# Patient Record
Sex: FEMALE | Race: ASIAN | Employment: OTHER | URBAN - METROPOLITAN AREA
[De-identification: names, ages, dates, MRNs, and addresses within clinical notes are randomized per-mention and may not be internally consistent; named-entity substitution may affect disease eponyms.]

---

## 2017-06-30 PROCEDURE — 87086 URINE CULTURE/COLONY COUNT: CPT | Performed by: UROLOGY

## 2017-06-30 PROCEDURE — 82330 ASSAY OF CALCIUM: CPT | Performed by: INTERNAL MEDICINE

## 2017-06-30 PROCEDURE — 81001 URINALYSIS AUTO W/SCOPE: CPT | Performed by: UROLOGY

## 2017-06-30 PROCEDURE — 83970 ASSAY OF PARATHORMONE: CPT | Performed by: INTERNAL MEDICINE

## 2017-07-28 PROBLEM — M81.0 OSTEOPOROSIS, UNSPECIFIED OSTEOPOROSIS TYPE, UNSPECIFIED PATHOLOGICAL FRACTURE PRESENCE: Status: ACTIVE | Noted: 2017-07-28

## 2017-07-28 PROBLEM — E21.0 PRIMARY HYPERPARATHYROIDISM (HCC): Status: ACTIVE | Noted: 2017-07-28

## 2018-06-27 PROCEDURE — 83970 ASSAY OF PARATHORMONE: CPT | Performed by: INTERNAL MEDICINE

## 2018-09-08 ENCOUNTER — HOSPITAL ENCOUNTER (OUTPATIENT)
Dept: GENERAL RADIOLOGY | Age: 83
Discharge: HOME | End: 2018-09-08
Attending: INTERNAL MEDICINE
Payer: MEDICARE

## 2018-09-08 DIAGNOSIS — M54.5 LOW BACK PAIN, UNSPECIFIED BACK PAIN LATERALITY, UNSPECIFIED CHRONICITY, WITH SCIATICA PRESENCE UNSPECIFIED: ICD-10-CM

## 2018-09-08 PROCEDURE — 72072 X-RAY EXAM THORAC SPINE 3VWS: CPT | Performed by: INTERNAL MEDICINE

## 2018-09-08 PROCEDURE — 72100 X-RAY EXAM L-S SPINE 2/3 VWS: CPT | Performed by: INTERNAL MEDICINE

## 2019-11-11 ENCOUNTER — HOSPITAL ENCOUNTER (OUTPATIENT)
Dept: ULTRASOUND IMAGING | Facility: HOSPITAL | Age: 84
Discharge: HOME | End: 2019-11-11
Attending: INTERNAL MEDICINE
Payer: MEDICARE

## 2019-11-11 ENCOUNTER — EKG ENCOUNTER (OUTPATIENT)
Dept: LAB | Facility: HOSPITAL | Age: 84
End: 2019-11-11
Attending: INTERNAL MEDICINE
Payer: MEDICARE

## 2019-11-11 DIAGNOSIS — R42 DIZZINESS: Primary | ICD-10-CM

## 2019-11-11 DIAGNOSIS — I10 HTN (HYPERTENSION): ICD-10-CM

## 2019-11-11 DIAGNOSIS — R42 DIZZINESS: ICD-10-CM

## 2019-11-11 PROCEDURE — 93010 ELECTROCARDIOGRAM REPORT: CPT | Performed by: INTERNAL MEDICINE

## 2019-11-11 PROCEDURE — 93005 ELECTROCARDIOGRAM TRACING: CPT

## 2019-11-11 PROCEDURE — 93880 EXTRACRANIAL BILAT STUDY: CPT | Performed by: INTERNAL MEDICINE

## 2021-10-12 ENCOUNTER — HOSPITAL ENCOUNTER (INPATIENT)
Facility: HOSPITAL | Age: 86
LOS: 4 days | Discharge: HOSPICE | DRG: 481 | End: 2021-10-17
Attending: EMERGENCY MEDICINE | Admitting: INTERNAL MEDICINE
Payer: MEDICARE

## 2021-10-12 DIAGNOSIS — S72.002A CLOSED FRACTURE OF LEFT HIP, INITIAL ENCOUNTER (HCC): Primary | ICD-10-CM

## 2021-10-13 ENCOUNTER — ANESTHESIA EVENT (OUTPATIENT)
Dept: SURGERY | Facility: HOSPITAL | Age: 86
DRG: 481 | End: 2021-10-13
Payer: MEDICARE

## 2021-10-13 ENCOUNTER — ANESTHESIA (OUTPATIENT)
Dept: EMERGENCY DEPT | Facility: HOSPITAL | Age: 86
DRG: 481 | End: 2021-10-13
Payer: MEDICARE

## 2021-10-13 ENCOUNTER — ANESTHESIA EVENT (OUTPATIENT)
Dept: EMERGENCY DEPT | Facility: HOSPITAL | Age: 86
DRG: 481 | End: 2021-10-13
Payer: MEDICARE

## 2021-10-13 ENCOUNTER — APPOINTMENT (OUTPATIENT)
Dept: GENERAL RADIOLOGY | Facility: HOSPITAL | Age: 86
DRG: 481 | End: 2021-10-13
Attending: EMERGENCY MEDICINE
Payer: MEDICARE

## 2021-10-13 PROBLEM — S72.002A CLOSED FRACTURE OF LEFT HIP (HCC): Status: ACTIVE | Noted: 2021-10-13

## 2021-10-13 PROBLEM — I10 PRIMARY HYPERTENSION: Chronic | Status: ACTIVE | Noted: 2021-10-13

## 2021-10-13 PROBLEM — S72.002A CLOSED FRACTURE OF LEFT HIP, INITIAL ENCOUNTER (HCC): Status: ACTIVE | Noted: 2021-10-13

## 2021-10-13 PROCEDURE — 3E0T3BZ INTRODUCTION OF ANESTHETIC AGENT INTO PERIPHERAL NERVES AND PLEXI, PERCUTANEOUS APPROACH: ICD-10-PCS

## 2021-10-13 PROCEDURE — 99222 1ST HOSP IP/OBS MODERATE 55: CPT | Performed by: ORTHOPAEDIC SURGERY

## 2021-10-13 PROCEDURE — 99223 1ST HOSP IP/OBS HIGH 75: CPT | Performed by: INTERNAL MEDICINE

## 2021-10-13 PROCEDURE — 73502 X-RAY EXAM HIP UNI 2-3 VIEWS: CPT | Performed by: EMERGENCY MEDICINE

## 2021-10-13 RX ORDER — HYDROCODONE BITARTRATE AND ACETAMINOPHEN 5; 325 MG/1; MG/1
1 TABLET ORAL EVERY 4 HOURS PRN
Status: DISCONTINUED | OUTPATIENT
Start: 2021-10-13 | End: 2021-10-17

## 2021-10-13 RX ORDER — CINACALCET 30 MG/1
30 TABLET, FILM COATED ORAL 2 TIMES DAILY WITH MEALS
Status: DISCONTINUED | OUTPATIENT
Start: 2021-10-13 | End: 2021-10-17

## 2021-10-13 RX ORDER — ONDANSETRON 2 MG/ML
4 INJECTION INTRAMUSCULAR; INTRAVENOUS EVERY 6 HOURS PRN
Status: DISCONTINUED | OUTPATIENT
Start: 2021-10-13 | End: 2021-10-15

## 2021-10-13 RX ORDER — MORPHINE SULFATE 2 MG/ML
1 INJECTION, SOLUTION INTRAMUSCULAR; INTRAVENOUS EVERY 2 HOUR PRN
Status: DISCONTINUED | OUTPATIENT
Start: 2021-10-13 | End: 2021-10-15

## 2021-10-13 RX ORDER — SODIUM CHLORIDE 9 MG/ML
INJECTION, SOLUTION INTRAVENOUS CONTINUOUS
Status: DISCONTINUED | OUTPATIENT
Start: 2021-10-13 | End: 2021-10-17

## 2021-10-13 RX ORDER — ENOXAPARIN SODIUM 100 MG/ML
40 INJECTION SUBCUTANEOUS DAILY
Status: DISCONTINUED | OUTPATIENT
Start: 2021-10-13 | End: 2021-10-13

## 2021-10-13 RX ORDER — ACETAMINOPHEN 325 MG/1
650 TABLET ORAL EVERY 4 HOURS PRN
Status: DISCONTINUED | OUTPATIENT
Start: 2021-10-13 | End: 2021-10-17

## 2021-10-13 RX ORDER — MORPHINE SULFATE 2 MG/ML
2 INJECTION, SOLUTION INTRAMUSCULAR; INTRAVENOUS ONCE
Status: COMPLETED | OUTPATIENT
Start: 2021-10-13 | End: 2021-10-13

## 2021-10-13 RX ORDER — HYDROCODONE BITARTRATE AND ACETAMINOPHEN 5; 325 MG/1; MG/1
2 TABLET ORAL EVERY 4 HOURS PRN
Status: DISCONTINUED | OUTPATIENT
Start: 2021-10-13 | End: 2021-10-17

## 2021-10-13 RX ORDER — MORPHINE SULFATE 4 MG/ML
4 INJECTION, SOLUTION INTRAMUSCULAR; INTRAVENOUS EVERY 2 HOUR PRN
Status: DISCONTINUED | OUTPATIENT
Start: 2021-10-13 | End: 2021-10-15

## 2021-10-13 RX ORDER — ROPIVACAINE HYDROCHLORIDE 5 MG/ML
30 INJECTION, SOLUTION EPIDURAL; INFILTRATION; PERINEURAL AS NEEDED
Status: DISCONTINUED | OUTPATIENT
Start: 2021-10-13 | End: 2021-10-13 | Stop reason: ALTCHOICE

## 2021-10-13 RX ORDER — TAMSULOSIN HYDROCHLORIDE 0.4 MG/1
0.4 CAPSULE ORAL DAILY
Status: DISCONTINUED | OUTPATIENT
Start: 2021-10-13 | End: 2021-10-17

## 2021-10-13 RX ORDER — ONDANSETRON 2 MG/ML
4 INJECTION INTRAMUSCULAR; INTRAVENOUS ONCE
Status: COMPLETED | OUTPATIENT
Start: 2021-10-13 | End: 2021-10-13

## 2021-10-13 RX ORDER — ENOXAPARIN SODIUM 100 MG/ML
30 INJECTION SUBCUTANEOUS DAILY
Status: DISCONTINUED | OUTPATIENT
Start: 2021-10-15 | End: 2021-10-17

## 2021-10-13 RX ORDER — LIDOCAINE HYDROCHLORIDE 10 MG/ML
2 INJECTION, SOLUTION EPIDURAL; INFILTRATION; INTRACAUDAL; PERINEURAL AS NEEDED
Status: DISCONTINUED | OUTPATIENT
Start: 2021-10-13 | End: 2021-10-13 | Stop reason: ALTCHOICE

## 2021-10-13 RX ORDER — SODIUM CHLORIDE 9 MG/ML
10 INJECTION INTRAVENOUS AS NEEDED
Status: DISCONTINUED | OUTPATIENT
Start: 2021-10-13 | End: 2021-10-13 | Stop reason: ALTCHOICE

## 2021-10-13 RX ORDER — MORPHINE SULFATE 2 MG/ML
2 INJECTION, SOLUTION INTRAMUSCULAR; INTRAVENOUS EVERY 2 HOUR PRN
Status: DISCONTINUED | OUTPATIENT
Start: 2021-10-13 | End: 2021-10-15

## 2021-10-13 NOTE — ED INITIAL ASSESSMENT (HPI)
Pt arrives via EMS from home, pt lives with daughter, she is at cartside for translation. Pt was getting out of bed where she lost her balance and fell on left side, pt states she has left hip pain. Denies LOC or head trauma.  Given 25 mg of fentanyl via EM

## 2021-10-13 NOTE — PROGRESS NOTES
NURSING ADMISSION NOTE      Patient admitted via Cart  Oriented to room. Daughter at bedside. Speaks mandarin with very little english. Safety precautions initiated. Bed in low position. Call light in reach.

## 2021-10-13 NOTE — H&P
[facility]                                                               History & Physical         Mert Galicia Patient Status:  Inpatient    1929 MRN [id number]   [facility] 3SW-A Attending Angus Coles MD   Hosp Day # 0 PCP smoking. She has never used smokeless tobacco. She reports that she does not drink alcohol and does not use drugs.     Allergies:    Penicillins             HIVES  Aspirin                     Comment:Upset stomach    Home Medications:  0.9%  NaCl infusion, 10/13/2021    CA 10.3 10/13/2021    ALB 3.3 10/13/2021    ALKPHO 92 10/13/2021    BILT 0.6 10/13/2021    TP 7.1 10/13/2021    AST 36 10/13/2021    ALT 27 10/13/2021       Imaging:  XRAY pelvis/right hip preliminary report as below  IMPRESSION:  Suggestion

## 2021-10-13 NOTE — ED PROVIDER NOTES
Patient Seen in: [facility] Emergency Department      History   Patient presents with:  Fall    Stated Complaint:     Subjective:   HPI    Patient is a 51-year-old female who presents from home with left-sided hip pain after she fell out of bed.   Pa Comments: Mildly uncomfortable. HENT:      Head: Normocephalic and atraumatic. Eyes:      Conjunctiva/sclera: Conjunctivae normal.      Pupils: Pupils are equal, round, and reactive to light.    Cardiovascular:      Rate and Rhythm: Normal rate and regu changes/callus formation is not excluded (vs artifact/overlying material). CT may be considered for better charaterization, as needed. Osteopenia. DJD/DDD/minor scoliosis. Left abd clips. Right lateral pelvic region soft tissue calcific opacity.

## 2021-10-13 NOTE — CONSULTS
[facility] Ortho Consult Note    CC: left hip injury    HPI: This 80year old female admitted from ER for fall/left hip fracture.  Per conversation with daughter (due to patient dementia) patient had a fall last November and sustained compression fractures in spin not oriented, shouting methodically  Psychological: appropriate affect  Respiratory: unlabored breathing  Cardiac: regular rate  Left lower extremity:  • Inspection: skin intact, no ecchymosis or swelling  • Palpation: tender to palpation about hip  • Rang [phone]  Fax [phone]

## 2021-10-13 NOTE — PROGRESS NOTES
[facility] Pharmacy Note:  Renal Dose Adjustment for enoxaparin (LOVENOX)    Angela Kehr has been prescribed enoxaparin 40 mg subcutaneously every 24 hours. Estimated Creatinine Clearance: 22.3 mL/min (A) (based on SCr of 1.27 mg/dL (H)).     Calculated CrCl 2

## 2021-10-13 NOTE — PLAN OF CARE
Patient resting in room, vitals WDL, on RA. SCD's. Tele. Purewick in place. Patient's sister at bedside. Patient speaks mandarin and understands verbal cues in [place]. Patient declines pain medication. Patient unable to move LLE.   Plan of care discussed w

## 2021-10-13 NOTE — PROGRESS NOTES
2:28 pm:  Patient hasn't voided since arriving to unit. No output in purewick canister or brief. Bladder scan showed 231 ml. Paged , recommended straight cath. 2:57 pm:  Straight cathed patient per , 400 ml out.  UA sent down to la

## 2021-10-13 NOTE — PROGRESS NOTES
Spoke to Dr. Haseeb Littlejohn regarding patient. Plan is for surgery tomorrow, NPO status to resume at midnight. OK to eat today per . Dr. Haseeb Littlejohn is planning on seeing the patient sometime today.

## 2021-10-13 NOTE — ANESTHESIA PROCEDURE NOTES
Regional Block  Performed by: Tabitha Johnson MD  Authorized by: Tabitha Johnson MD       General Information and Staff    Start Time:  10/13/2021 1:23 AM  End Time:  10/13/2021 1:29 AM  Anesthesiologist:  Tabitha Johnson MD  Performed by:   Anesthesiologist  Patient L

## 2021-10-13 NOTE — ANESTHESIA PREPROCEDURE EVALUATION
PRE-OP EVALUATION    Patient Name: Katerina Hooker    Admit Diagnosis: Closed fracture of left hip[facility] L[facility]    Pre-op Diagnosis: INPT    NAILING OF LEFT PROXIMAL FEMUR FRACTURE    Anesthesia Procedure: NAILING OF LEFT PROXIMAL FEMUR Rfl: 0, Unknown at Unknown time  Omega-3 Fatty Acids (FISH OIL OR), Take by mouth., Disp: , Rfl: , Unknown at Unknown time  Misc Natural Products (GLUCOSAMINE CHOND COMPLEX/MSM OR), Take by mouth., Disp: , Rfl: , Unknown at Unknown time  Acetaminophen 500 MCHC 30.9 (L) 10/13/2021    RDW 13.7 10/13/2021    .0 10/13/2021     Lab Results   Component Value Date     10/13/2021    K 4.3 10/13/2021     10/13/2021    CO2 28.0 10/13/2021    BUN 26 (H) 10/13/2021    CREATSERUM 1.27 (H) 10/13/202

## 2021-10-13 NOTE — PLAN OF CARE
Patient admitted from the ED with a L hip fracture after falling out of bed at home. Patient lives full time with her daughter. Patient is confused and oriented to self. Roxanna Bucio, daughter at bedside for translation.  Femoral block in ED for pain man

## 2021-10-14 ENCOUNTER — APPOINTMENT (OUTPATIENT)
Dept: GENERAL RADIOLOGY | Facility: HOSPITAL | Age: 86
DRG: 481 | End: 2021-10-14
Attending: ORTHOPAEDIC SURGERY
Payer: MEDICARE

## 2021-10-14 ENCOUNTER — ANESTHESIA (OUTPATIENT)
Dept: SURGERY | Facility: HOSPITAL | Age: 86
DRG: 481 | End: 2021-10-14
Payer: MEDICARE

## 2021-10-14 PROCEDURE — 27245 TREAT THIGH FRACTURE: CPT | Performed by: ORTHOPAEDIC SURGERY

## 2021-10-14 PROCEDURE — 76000 FLUOROSCOPY <1 HR PHYS/QHP: CPT | Performed by: ORTHOPAEDIC SURGERY

## 2021-10-14 PROCEDURE — 0QS706Z REPOSITION LEFT UPPER FEMUR WITH INTRAMEDULLARY INTERNAL FIXATION DEVICE, OPEN APPROACH: ICD-10-PCS | Performed by: ORTHOPAEDIC SURGERY

## 2021-10-14 PROCEDURE — 99233 SBSQ HOSP IP/OBS HIGH 50: CPT | Performed by: HOSPITALIST

## 2021-10-14 PROCEDURE — 76942 ECHO GUIDE FOR BIOPSY: CPT | Performed by: ANESTHESIOLOGY

## 2021-10-14 PROCEDURE — 73501 X-RAY EXAM HIP UNI 1 VIEW: CPT | Performed by: ORTHOPAEDIC SURGERY

## 2021-10-14 DEVICE — ZNN CMN NAIL 11.5MMX21.5CM 130L
Type: IMPLANTABLE DEVICE | Site: HIP | Status: FUNCTIONAL
Brand: ZIMMER® NATURAL NAIL® SYSTEM

## 2021-10-14 DEVICE — ZNN CMN LAG SCREW 10.5X90
Type: IMPLANTABLE DEVICE | Site: HIP | Status: FUNCTIONAL
Brand: ZIMMER® NATURAL NAIL® SYSTEM

## 2021-10-14 DEVICE — SCREW CORT FA 5.0X35 Z NAIL: Type: IMPLANTABLE DEVICE | Site: HIP | Status: FUNCTIONAL

## 2021-10-14 RX ORDER — SODIUM CHLORIDE, SODIUM LACTATE, POTASSIUM CHLORIDE, CALCIUM CHLORIDE 600; 310; 30; 20 MG/100ML; MG/100ML; MG/100ML; MG/100ML
INJECTION, SOLUTION INTRAVENOUS CONTINUOUS
Status: DISCONTINUED | OUTPATIENT
Start: 2021-10-14 | End: 2021-10-14 | Stop reason: HOSPADM

## 2021-10-14 RX ORDER — DEXAMETHASONE SODIUM PHOSPHATE 4 MG/ML
4 VIAL (ML) INJECTION AS NEEDED
Status: DISCONTINUED | OUTPATIENT
Start: 2021-10-14 | End: 2021-10-14 | Stop reason: HOSPADM

## 2021-10-14 RX ORDER — NALOXONE HYDROCHLORIDE 0.4 MG/ML
80 INJECTION, SOLUTION INTRAMUSCULAR; INTRAVENOUS; SUBCUTANEOUS AS NEEDED
Status: DISCONTINUED | OUTPATIENT
Start: 2021-10-14 | End: 2021-10-14 | Stop reason: HOSPADM

## 2021-10-14 RX ORDER — ROCURONIUM BROMIDE 10 MG/ML
INJECTION, SOLUTION INTRAVENOUS AS NEEDED
Status: DISCONTINUED | OUTPATIENT
Start: 2021-10-14 | End: 2021-10-14 | Stop reason: SURG

## 2021-10-14 RX ORDER — HYDROMORPHONE HYDROCHLORIDE 1 MG/ML
0.4 INJECTION, SOLUTION INTRAMUSCULAR; INTRAVENOUS; SUBCUTANEOUS EVERY 5 MIN PRN
Status: DISCONTINUED | OUTPATIENT
Start: 2021-10-14 | End: 2021-10-14 | Stop reason: HOSPADM

## 2021-10-14 RX ORDER — ERGOCALCIFEROL 1.25 MG/1
50000 CAPSULE ORAL
Status: DISCONTINUED | OUTPATIENT
Start: 2021-10-15 | End: 2021-10-17

## 2021-10-14 RX ORDER — ACETAMINOPHEN 500 MG
1000 TABLET ORAL EVERY 6 HOURS PRN
Status: DISCONTINUED | OUTPATIENT
Start: 2021-10-14 | End: 2021-10-17

## 2021-10-14 RX ORDER — ONDANSETRON 2 MG/ML
4 INJECTION INTRAMUSCULAR; INTRAVENOUS AS NEEDED
Status: DISCONTINUED | OUTPATIENT
Start: 2021-10-14 | End: 2021-10-14 | Stop reason: HOSPADM

## 2021-10-14 RX ORDER — METOCLOPRAMIDE HYDROCHLORIDE 5 MG/ML
10 INJECTION INTRAMUSCULAR; INTRAVENOUS AS NEEDED
Status: DISCONTINUED | OUTPATIENT
Start: 2021-10-14 | End: 2021-10-14 | Stop reason: HOSPADM

## 2021-10-14 RX ORDER — HYDROCODONE BITARTRATE AND ACETAMINOPHEN 5; 325 MG/1; MG/1
1 TABLET ORAL AS NEEDED
Status: DISCONTINUED | OUTPATIENT
Start: 2021-10-14 | End: 2021-10-14 | Stop reason: HOSPADM

## 2021-10-14 RX ORDER — ONDANSETRON 2 MG/ML
4 INJECTION INTRAMUSCULAR; INTRAVENOUS EVERY 4 HOURS PRN
Status: ACTIVE | OUTPATIENT
Start: 2021-10-14 | End: 2021-10-16

## 2021-10-14 RX ORDER — PROCHLORPERAZINE EDISYLATE 5 MG/ML
10 INJECTION INTRAMUSCULAR; INTRAVENOUS EVERY 6 HOURS PRN
Status: ACTIVE | OUTPATIENT
Start: 2021-10-14 | End: 2021-10-16

## 2021-10-14 RX ORDER — CEFAZOLIN SODIUM/WATER 2 G/20 ML
2 SYRINGE (ML) INTRAVENOUS EVERY 8 HOURS
Status: CANCELLED | OUTPATIENT
Start: 2021-10-15 | End: 2021-10-15

## 2021-10-14 RX ORDER — HYDROMORPHONE HYDROCHLORIDE 1 MG/ML
INJECTION, SOLUTION INTRAMUSCULAR; INTRAVENOUS; SUBCUTANEOUS
Status: COMPLETED
Start: 2021-10-14 | End: 2021-10-14

## 2021-10-14 RX ORDER — SODIUM CHLORIDE, SODIUM LACTATE, POTASSIUM CHLORIDE, CALCIUM CHLORIDE 600; 310; 30; 20 MG/100ML; MG/100ML; MG/100ML; MG/100ML
INJECTION, SOLUTION INTRAVENOUS CONTINUOUS PRN
Status: DISCONTINUED | OUTPATIENT
Start: 2021-10-14 | End: 2021-10-14 | Stop reason: SURG

## 2021-10-14 RX ORDER — CEFAZOLIN SODIUM 1 G/3ML
INJECTION, POWDER, FOR SOLUTION INTRAMUSCULAR; INTRAVENOUS AS NEEDED
Status: DISCONTINUED | OUTPATIENT
Start: 2021-10-14 | End: 2021-10-14 | Stop reason: SURG

## 2021-10-14 RX ORDER — ENOXAPARIN SODIUM 100 MG/ML
30 INJECTION SUBCUTANEOUS DAILY
Status: DISCONTINUED | OUTPATIENT
Start: 2021-10-15 | End: 2021-10-15

## 2021-10-14 RX ORDER — HYDROCODONE BITARTRATE AND ACETAMINOPHEN 5; 325 MG/1; MG/1
2 TABLET ORAL AS NEEDED
Status: DISCONTINUED | OUTPATIENT
Start: 2021-10-14 | End: 2021-10-14 | Stop reason: HOSPADM

## 2021-10-14 RX ORDER — NEOSTIGMINE METHYLSULFATE 1 MG/ML
INJECTION INTRAVENOUS AS NEEDED
Status: DISCONTINUED | OUTPATIENT
Start: 2021-10-14 | End: 2021-10-14 | Stop reason: SURG

## 2021-10-14 RX ORDER — DEXAMETHASONE SODIUM PHOSPHATE 4 MG/ML
VIAL (ML) INJECTION AS NEEDED
Status: DISCONTINUED | OUTPATIENT
Start: 2021-10-14 | End: 2021-10-14 | Stop reason: SURG

## 2021-10-14 RX ORDER — DOXEPIN HYDROCHLORIDE 50 MG/1
1 CAPSULE ORAL DAILY
Status: DISCONTINUED | OUTPATIENT
Start: 2021-10-15 | End: 2021-10-17

## 2021-10-14 RX ORDER — MIDAZOLAM HYDROCHLORIDE 1 MG/ML
1 INJECTION INTRAMUSCULAR; INTRAVENOUS EVERY 5 MIN PRN
Status: DISCONTINUED | OUTPATIENT
Start: 2021-10-14 | End: 2021-10-14 | Stop reason: HOSPADM

## 2021-10-14 RX ORDER — GLYCOPYRROLATE 0.2 MG/ML
INJECTION, SOLUTION INTRAMUSCULAR; INTRAVENOUS AS NEEDED
Status: DISCONTINUED | OUTPATIENT
Start: 2021-10-14 | End: 2021-10-14 | Stop reason: SURG

## 2021-10-14 RX ORDER — TRAMADOL HYDROCHLORIDE 50 MG/1
50 TABLET ORAL EVERY 12 HOURS PRN
Status: DISCONTINUED | OUTPATIENT
Start: 2021-10-15 | End: 2021-10-17

## 2021-10-14 RX ORDER — HYDROMORPHONE HYDROCHLORIDE 1 MG/ML
0.2 INJECTION, SOLUTION INTRAMUSCULAR; INTRAVENOUS; SUBCUTANEOUS EVERY 4 HOURS PRN
Status: DISCONTINUED | OUTPATIENT
Start: 2021-10-14 | End: 2021-10-17

## 2021-10-14 RX ORDER — PHENYLEPHRINE HCL 10 MG/ML
VIAL (ML) INJECTION AS NEEDED
Status: DISCONTINUED | OUTPATIENT
Start: 2021-10-14 | End: 2021-10-14 | Stop reason: SURG

## 2021-10-14 RX ORDER — CALCIUM CARBONATE/VITAMIN D3 250-3.125
2 TABLET ORAL
Status: DISCONTINUED | OUTPATIENT
Start: 2021-10-15 | End: 2021-10-17

## 2021-10-14 RX ORDER — SENNOSIDES 8.6 MG
17.2 TABLET ORAL NIGHTLY
Status: DISCONTINUED | OUTPATIENT
Start: 2021-10-14 | End: 2021-10-17

## 2021-10-14 RX ADMIN — DEXAMETHASONE SODIUM PHOSPHATE 8 MG: 4 MG/ML VIAL (ML) INJECTION at 18:19:00

## 2021-10-14 RX ADMIN — NEOSTIGMINE METHYLSULFATE 5 MG: 1 INJECTION INTRAVENOUS at 20:04:00

## 2021-10-14 RX ADMIN — SODIUM CHLORIDE, SODIUM LACTATE, POTASSIUM CHLORIDE, CALCIUM CHLORIDE: 600; 310; 30; 20 INJECTION, SOLUTION INTRAVENOUS at 19:00:00

## 2021-10-14 RX ADMIN — GLYCOPYRROLATE 0.6 MG: 0.2 INJECTION, SOLUTION INTRAMUSCULAR; INTRAVENOUS at 20:04:00

## 2021-10-14 RX ADMIN — PHENYLEPHRINE HCL 200 MCG: 10 MG/ML VIAL (ML) INJECTION at 18:39:00

## 2021-10-14 RX ADMIN — DEXAMETHASONE SODIUM PHOSPHATE 2 MG: 4 MG/ML VIAL (ML) INJECTION at 18:10:00

## 2021-10-14 RX ADMIN — PHENYLEPHRINE HCL 100 MCG: 10 MG/ML VIAL (ML) INJECTION at 18:24:00

## 2021-10-14 RX ADMIN — SODIUM CHLORIDE: 9 INJECTION, SOLUTION INTRAVENOUS at 19:00:00

## 2021-10-14 RX ADMIN — CEFAZOLIN SODIUM 1 G: 1 INJECTION, POWDER, FOR SOLUTION INTRAMUSCULAR; INTRAVENOUS at 18:12:00

## 2021-10-14 RX ADMIN — CEFAZOLIN SODIUM 1 G: 1 INJECTION, POWDER, FOR SOLUTION INTRAMUSCULAR; INTRAVENOUS at 18:14:00

## 2021-10-14 RX ADMIN — ROCURONIUM BROMIDE 40 MG: 10 INJECTION, SOLUTION INTRAVENOUS at 18:05:00

## 2021-10-14 RX ADMIN — PHENYLEPHRINE HCL 200 MCG: 10 MG/ML VIAL (ML) INJECTION at 19:09:00

## 2021-10-14 RX ADMIN — ROCURONIUM BROMIDE 10 MG: 10 INJECTION, SOLUTION INTRAVENOUS at 18:42:00

## 2021-10-14 NOTE — PLAN OF CARE
PT ALERT, DISORIENTED, DAUGHTER AT BEDSIDE, 97% ON RA, SR,ST, AFEBRILE, NPO FROM MIDNIGHT, SCDS ON, PT MOANING/YELLING OUT THRU NIGHT, , IVF INFUSING, BLADDER SCAN AT 2200 - 65, BLADDER SCAN AT 0600 - 462, ALAMO INSERTED WITH 400 PRESLEY URINE RETURN, U/A SE

## 2021-10-14 NOTE — ANESTHESIA PROCEDURE NOTES
Airway  Date/Time: 10/14/2021 6:07 PM  Urgency: elective      General Information and Staff    Patient location during procedure: OR  Anesthesiologist: Marlin Mondragon MD  Performed: anesthesiologist     Indications and Patient Condition  Indications for a

## 2021-10-14 NOTE — CM/SW NOTE
10/14/21 1000   CM/SW Referral Data   Referral Source Social Work (self-referral)   Reason for Referral Discharge planning   Informant Daughter   Patient Info   Patient Communication Issues Language barrier   Patient's [address]   Number of

## 2021-10-14 NOTE — ANESTHESIA PROCEDURE NOTES
Regional Block  Performed by: Dorie Rosario MD  Authorized by: Dorie Rosario MD       General Information and Staff    Start Time:  10/14/2021 6:07 PM  End Time:  10/14/2021 6:10 PM  Anesthesiologist:  Dorie Rosario MD  Performed by:   Anesthesiologis

## 2021-10-14 NOTE — PROGRESS NOTES
[facility]  Progress Note     Ester Bueno Patient Status:  Inpatient    1929 MRN [id number]   [facility] 3SW-A Attending Samia Jordan MD   Hosp Day # 1 PCP Aliyah Zamora MD     Chief Complaint: hip pain    S: Patient pt confuse cefTRIAXone  1 g Intravenous Q24H   • mupirocin  1 Application Each Nare BID   • [START ON 10/15/2021] enoxaparin  30 mg Subcutaneous Daily       ASSESSMENT / PLAN:     #Acute left hip fracture with left femoral proximal neck fracture status post mechanica

## 2021-10-14 NOTE — PLAN OF CARE
Pt alert to self. Hx dementia. On room air. Scds to BLE. Tele and  monitoring maintained. Last BM 10/12/21. Gunn catheter drain clear anahi urine. Pain managed with IV medications. Remains on bedrest. Bed alarm on.  Pt plan is for surgery this afternoon

## 2021-10-15 PROCEDURE — 99232 SBSQ HOSP IP/OBS MODERATE 35: CPT | Performed by: HOSPITALIST

## 2021-10-15 RX ORDER — SODIUM PHOSPHATE, DIBASIC AND SODIUM PHOSPHATE, MONOBASIC 7; 19 G/133ML; G/133ML
1 ENEMA RECTAL ONCE AS NEEDED
Status: DISCONTINUED | OUTPATIENT
Start: 2021-10-15 | End: 2021-10-17

## 2021-10-15 RX ORDER — DOCUSATE SODIUM 100 MG/1
100 CAPSULE, LIQUID FILLED ORAL 2 TIMES DAILY
Status: DISCONTINUED | OUTPATIENT
Start: 2021-10-15 | End: 2021-10-17

## 2021-10-15 RX ORDER — BISACODYL 10 MG
10 SUPPOSITORY, RECTAL RECTAL
Status: DISCONTINUED | OUTPATIENT
Start: 2021-10-15 | End: 2021-10-17

## 2021-10-15 RX ORDER — POLYETHYLENE GLYCOL 3350 17 G/17G
17 POWDER, FOR SOLUTION ORAL DAILY PRN
Status: DISCONTINUED | OUTPATIENT
Start: 2021-10-15 | End: 2021-10-17

## 2021-10-15 NOTE — PLAN OF CARE
Pt alert to self. Hx dementia. On room air. Pt refused Scds.  monitoring maintained. Tele discontinued today. Last BM 10/12/21. Gunn catheter draining clear anahi urine. Pain managed with current medications.  Did not participate well with physical ther

## 2021-10-15 NOTE — HOSPICE RN NOTE
Met with pt and daughter in room. Pt slept throughout visit. Discussed hospice philosophy, services, and goals of care. Daughter wants to sign consents tomorrow and get equipment/comfort meds ordered. Dtr further wants to continue home medciations.   An

## 2021-10-15 NOTE — PROGRESS NOTES
[facility]  Progress Note     Michaeljuan Stern Patient Status:  Inpatient    1929 MRN [id number]   [facility] 3SW-A Attending Dave Diaz MD   Hosp Day # 2 PCP Gayatri Villela MD     Chief Complaint: hip pain    S: Patient pt confuse reviewed in Epic.     Medications:   • docusate sodium  100 mg Oral BID   • Senna  17.2 mg Oral Nightly   • multivitamin  1 tablet Oral Daily   • calcium carbonate-vitamin D  2 tablet Oral TID CC   • ergocalciferol  50,000 Units Oral Q7 Days   • cinacalcet discharge, patient will require TBD.

## 2021-10-15 NOTE — ANESTHESIA POSTPROCEDURE EVALUATION
KAVITA Paul 16 Patient Status:  Inpatient   Age/Gender 80year old female MRN [id number]   [facility] SURGERY Attending Abdi Dwyer MD   Hosp Day # 1 PCP Ronald Haynes MD       Anesthesia Post-op Note    NAILING OF LEFT PROXI

## 2021-10-15 NOTE — PROGRESS NOTES
[facility] Ortho Progress Note    Subjective: no acute events. Pain better controlled, less moaning.     Objective: awake, not alert or oriented  LLE dressing cdi      Labs: Hgb 7.6      Assessment/Plan: 80year old female postop day #1 status post ORIF L hip inte

## 2021-10-15 NOTE — PROGRESS NOTES
Residential liaison received hospice referral liaison spoke with daughter Power Rhodes and scheduled hospice meeting today at 3p.

## 2021-10-15 NOTE — PLAN OF CARE
A/o x self hx dementia. Unable to follow commands. Pt laying on R side. RA/. Tele. Scd's. LBM 10/12. Gunn in place for retention. IVF infusing. Adaptic, 4x4 and Tegaderm x2 in place clean, dry and intact. Unable to answer questions appropriately.  IV ro

## 2021-10-15 NOTE — PROGRESS NOTES
Hospice here today. Pt's daughter agreeable to Hospice care on dc. They will order all equipment and supplies for at home tomorrow, and also sign all consents at that time. Plan will be for discharge on Sunday afternoon.  Pt'd daughter just wants to make bah

## 2021-10-15 NOTE — PROGRESS NOTES
Pharmacy Note: Renal dose adjustment for Tramadol (Ultram)  Campos Gibbons has been prescribed Tramadol (Ultram)  50 mg orally every 8 hours as needed for pain. Estimated Creatinine Clearance: 22.3 mL/min (A) (based on SCr of 1.27 mg/dL (H)).     Her calc

## 2021-10-15 NOTE — CM/SW NOTE
Spoke with pt's RN and met with pt's dtr at bedside to discuss DC planning. PT/OT eval pending for 1pm today. Pt's dtr expressing concern about DC planning.   Pt's dtr feels due to language barrier and pt's cognitive status, she would need to be with pt 2

## 2021-10-15 NOTE — PHYSICAL THERAPY NOTE
PHYSICAL THERAPY EVALUATION - INPATIENT     Room Number: 352/352-A  Evaluation Date: 10/15/2021  Type of Evaluation: Initial  Physician Order: PT Eval and Treat    Presenting Problem: s/p fall, s/p ORIF of L hip intertrochanteric fx on 10/14/2021  Re with her daughter who is her primary caregiver. Pt's daughter assists pt with all ADL and functional mobility. Pt typically uses a cane or RW. Pt crawls up the stairs with assistance. \"    SUBJECTIVE  Pt is Mandarin speaking,  via Ipad used for side. Pt unwilling to move LLE due to pain    Lower extremity strength: Pt unwilling to move LLE or have LLE moved due to pain, pt moving RLE frequently in supine     BALANCE  Static Sitting: Not tested  Dynamic Sitting: Not tested  Static Standing: Not te understanding of information provided. Pt assisted in donning gown with high resistance from pt. Pt also highly resistant to repositioning.  Increased time required, use of draw sheet, and max assist of 2 needed to reposition pt onto her back with pillows b RECOMMENDATIONS  PT Discharge Recommendations: 24 hour care/supervision    PLAN  PT Treatment Plan: Family education; Bed mobility; Body mechanics; Transfer training  Rehab Potential : Poor  Frequency (Obs):  (once for CG training )  Number of Visits to Meet

## 2021-10-15 NOTE — OPERATIVE REPORT
Operative Note    Patient Name/:  Aldair James 1929  Date: 10/14/2021  Location: [facility]  Preoperative Diagnosis: Acute closed traumatic displaced left hip intertrochanteric fracture  Postoperative Diagnosis: Same  Operation: Open reduction i short nail. This was placed, impacted down appropriately. Then using fluoroscopy on the AP and lateral, I did hold reduction and place a guidewire into the center of the femoral head. Once this was verified in appropriate position, it measured 90 mm.   Kristie Christensen

## 2021-10-15 NOTE — OCCUPATIONAL THERAPY NOTE
OCCUPATIONAL THERAPY QUICK EVALUATION - INPATIENT    Room Number: 352/352-A  Evaluation Date: 10/15/2021     Type of Evaluation: Quick Eval  Presenting Problem: s/p ORIF of L intertrochanteric fx on 10/14     Physician Order: IP Consult to Occupational The functional mobility. Pt typically uses a cane or RW. Pt crawls up the stairs with assistance. SUBJECTIVE   \"I don't want her to not qualify for rehab because I wasn't here. \" per pt's daughter     Patient self-stated goal is none identified     OBJECT  thinking she was someone else. Pt resistant to any movement even repositioning in the bed. Pt demo high pain behavior, swatting at staff. Pt required increased time for repositioning to her back with pillows between her legs.  Pt taking clothing Assessment/Performance Deficits  MODERATE - Comorbidities and min to mod modifications of tasks    Clinical Decision Making  MODERATE - Analysis of occupational profile, detailed assessments, several treatment options    Overall Complexity  MODERATE     OT

## 2021-10-16 PROCEDURE — 30233N1 TRANSFUSION OF NONAUTOLOGOUS RED BLOOD CELLS INTO PERIPHERAL VEIN, PERCUTANEOUS APPROACH: ICD-10-PCS | Performed by: HOSPITALIST

## 2021-10-16 PROCEDURE — 99232 SBSQ HOSP IP/OBS MODERATE 35: CPT | Performed by: INTERNAL MEDICINE

## 2021-10-16 RX ORDER — LOSARTAN POTASSIUM 50 MG/1
50 TABLET ORAL DAILY
Status: DISCONTINUED | OUTPATIENT
Start: 2021-10-16 | End: 2021-10-17

## 2021-10-16 RX ORDER — SODIUM CHLORIDE 9 MG/ML
INJECTION, SOLUTION INTRAVENOUS ONCE
Status: COMPLETED | OUTPATIENT
Start: 2021-10-16 | End: 2021-10-16

## 2021-10-16 NOTE — PHYSICAL THERAPY NOTE
PHYSICAL THERAPY TREATMENT NOTE - INPATIENT    Room Number: 352/352-A     Session: 1     Number of Visits to Meet Established Goals: 1    Presenting Problem: s/p fall, s/p ORIF of L hip intertrochanteric fx on 10/14/2021    ASSESSMENT     Writer complet over in bed (including adjusting bedclothes, sheets and blankets)?: A Lot   -   Sitting down on and standing up from a chair with arms (e.g., wheelchair, bedside commode, etc.): Unable   -   Moving from lying on back to sitting on the side of the bed?: Lyn Oneill

## 2021-10-16 NOTE — PLAN OF CARE
Alert orientated to self. Hx of dementia. VSS on room air. Unable to follow commands. Gunn in place. Max assist. Left hip dressing c/d/I. Daughter at bedside. Plan of care reviewed. Bed alarm on. Call light within reach.

## 2021-10-16 NOTE — PROGRESS NOTES
[facility]     Hospitalist Progress Note     Geo Townsend Patient Status:  Inpatient    1929 MRN [id number]   [facility] 3SW-A Attending Evonne Clemons MD   Hosp Day # 3 PCP Abdiel Mensha MD     Chief Complaint: feels cold     Chris Frederick results for input(s): TROP, PBNP in the last 168 hours. Creatinine Kinase  No results for input(s): CK in the last 168 hours. Inflammatory Markers  No results for input(s): CRP, KEITH, LDH, DDIMER in the last 168 hours.     Imaging: Imaging data reviewe daughter     Mary Lovelace MD    Supplementary Documentation:       Quality:  · DVT Prophylaxis: SCD.    · Gunn: no        Estimated date of discharge: tbd  Discharge is dependent on: course, placement found, hospice eval   At this point Ms. Hubert Chauhan is expect

## 2021-10-16 NOTE — PLAN OF CARE
POD 2 Lt femur IM nailing, Pt is AAOX1, hx of dementia, speaks only Mandarin, daughter stays at bedside, VSS, [place], PO meds for pain control, IV SL, will get 1 unit PRBC's today, horne D/T retention, Pt can be WBAT, gauze dressing to hip remains CDI, plan f

## 2021-10-17 VITALS
DIASTOLIC BLOOD PRESSURE: 82 MMHG | RESPIRATION RATE: 18 BRPM | HEART RATE: 85 BPM | TEMPERATURE: 98 F | WEIGHT: 110 LBS | HEIGHT: 64 IN | OXYGEN SATURATION: 100 % | SYSTOLIC BLOOD PRESSURE: 161 MMHG | BODY MASS INDEX: 18.78 KG/M2

## 2021-10-17 PROCEDURE — 99024 POSTOP FOLLOW-UP VISIT: CPT | Performed by: ORTHOPAEDIC SURGERY

## 2021-10-17 PROCEDURE — 99239 HOSP IP/OBS DSCHRG MGMT >30: CPT | Performed by: INTERNAL MEDICINE

## 2021-10-17 RX ORDER — TAMSULOSIN HYDROCHLORIDE 0.4 MG/1
0.4 CAPSULE ORAL DAILY
Qty: 30 CAPSULE | Refills: 0 | Status: SHIPPED | OUTPATIENT
Start: 2021-10-18 | End: 2021-11-17

## 2021-10-17 NOTE — PLAN OF CARE
NURSING DISCHARGE NOTE    Discharged Home via Ambulance. Accompanied by Family member  Belongings Taken by patient/family. AVS printed and discussed, IV removed. Pt ready to discharge home with family to hospice care.

## 2021-10-17 NOTE — HOSPICE RN NOTE
Confirmed ambulance p/u for noon. DME delivered yesterday. Family friend did not accept comfort med delivery- rescheduled for afternoon delivery today. Diogenes Brunner, RN to premedicate for ambulance ride, dtr agreeable. POLST on chart not signed by attending.  Te

## 2021-10-17 NOTE — DISCHARGE SUMMARY
[facility]  DISCHARGE SUMMARY     John Phan Patient Status:  Inpatient    1929 MRN [id number]   [facility] 3SW-A Attending No att. providers found   [facility] Day # 4 PCP Ronald Haynes MD     Date of Admission: 10/12/2021  Date of D hospice care. Lace+ Score: 66  59-90 High Risk  29-58 Medium Risk  0-28   Low Risk         TCM Follow-Up Recommendation:  LACE > 58:  High Risk of readmission after discharge from the hospital.  Consultants:  • Orthopedic surgery    Discharge Medication signs: Temp:  [97.8 °F (36.6 °C)] 97.8 °F (36.6 °C)  Pulse:  [85] 85  Resp:  [18] 18  BP: (161)/(82) 161/82    Physical Exam:    General: No acute distress. Respiratory: Clear to auscultation bilaterally. No wheezes. No rhonchi.   Cardiovascular: S1, S2.

## 2021-10-17 NOTE — PLAN OF CARE
POD 3 Lt femur IM nailing, Pt is AAOX1, hx of dementia, speaks only Mandarin, daughter stays at bedside, VSS, [place], PO meds for pain control, IV SL, HGB stable today, horne D/T retention, Pt can be WBAT, coverlets and gauze dressing to hip remains CDI, plan

## 2021-10-17 NOTE — HOSPICE RN NOTE
Late entry-    [facility] met for follow up with dtr. Consents signed, dme and meds ordered for delivery today. Discharge anticipated for noon [place] 10/17/21. [facility] scheduled for noon .   Bubble chat sent to Vannessa Yao

## 2021-10-29 ENCOUNTER — TELEPHONE (OUTPATIENT)
Dept: ORTHOPEDICS CLINIC | Facility: CLINIC | Age: 86
End: 2021-10-29

## 2021-10-29 NOTE — TELEPHONE ENCOUNTER
Patient's daughter cancelled appt on 11/01/21 because she is bed bound. The nurse from the Hospice came to take out some stitches but there is still some more stiches that grew in the skin that they cannot get out.  Now is growing into the skin, they have b

## 2021-10-29 NOTE — TELEPHONE ENCOUNTER
Sx date: 10/14/21. Spoke with patient's daughter who stated that the hospice nurse had tried to take out the sutures, but 3-4 are unable to come out d/t the skin growing around them.  Pt's daughter said she is keeping those sites clean with soap and hipolito

## 2021-10-29 NOTE — TELEPHONE ENCOUNTER
Either they can come in here to get stitches out, or they can have hospice nurse take the stitches out at home - no other options for stitch removal. If hospice nurse says it is too difficult for her to remove then the only option is for them to come in to

## 2021-10-29 NOTE — TELEPHONE ENCOUNTER
Spoke with patient's daughter, and notified her that if there is still difficulty with getting the stitches out Monday when the hospice nurse calls, then the hospice nurse should contact our office to discuss this further.  Office number provided to danny

## 2021-11-15 ENCOUNTER — TELEPHONE (OUTPATIENT)
Dept: ORTHOPEDICS CLINIC | Facility: CLINIC | Age: 86
End: 2021-11-15

## 2021-11-15 DIAGNOSIS — S72.002A CLOSED FRACTURE OF LEFT HIP, INITIAL ENCOUNTER (HCC): Primary | ICD-10-CM

## 2021-11-15 NOTE — TELEPHONE ENCOUNTER
Spoke with patient's daughter and notified her that PT orders are placed. She was notified these are external orders, so the pt can go somewhere by her home.  She verbalized understnading and stated she will call back with the location and fax of where the

## 2021-11-15 NOTE — TELEPHONE ENCOUNTER
Pt daughter Concepción Valencia called and is requesting if pt can start PT because she is still in bed and is refusing to get up. Pt has missed the post-op because she is in bed and they need ambulance to bring pt at the clinic (as per Jigardannie Valencia).  Please advice and  info

## 2021-11-15 NOTE — TELEPHONE ENCOUNTER
Verified voicemail reached. Attempted to call Lyndsay Hobson daughter regarding request for callback to office regarding therapy orders. Reached voicemail, left voicemail and provided a detail message with my call back contact information.

## 2022-01-27 ENCOUNTER — HOSPITAL ENCOUNTER (INPATIENT)
Facility: HOSPITAL | Age: 87
LOS: 4 days | Discharge: HOME HEALTH | DRG: 690 | End: 2022-01-31
Attending: EMERGENCY MEDICINE | Admitting: INTERNAL MEDICINE
Payer: MEDICARE

## 2022-01-27 ENCOUNTER — APPOINTMENT (OUTPATIENT)
Dept: GENERAL RADIOLOGY | Facility: HOSPITAL | Age: 87
DRG: 690 | End: 2022-01-27
Attending: EMERGENCY MEDICINE
Payer: MEDICARE

## 2022-01-27 DIAGNOSIS — R63.30 POOR FEEDING: ICD-10-CM

## 2022-01-27 DIAGNOSIS — N39.0 URINARY TRACT INFECTION WITHOUT HEMATURIA, SITE UNSPECIFIED: Primary | ICD-10-CM

## 2022-01-27 LAB
ALBUMIN SERPL-MCNC: 3.4 G/DL (ref 3.4–5)
ALBUMIN/GLOB SERPL: 1.1 {RATIO} (ref 1–2)
ALP LIVER SERPL-CCNC: 63 U/L
ALT SERPL-CCNC: 22 U/L
ANION GAP SERPL CALC-SCNC: 6 MMOL/L (ref 0–18)
AST SERPL-CCNC: 44 U/L (ref 15–37)
BASOPHILS # BLD AUTO: 0.01 X10(3) UL (ref 0–0.2)
BASOPHILS NFR BLD AUTO: 0.2 %
BILIRUB SERPL-MCNC: 0.8 MG/DL (ref 0.1–2)
BILIRUB UR QL STRIP.AUTO: NEGATIVE
BUN BLD-MCNC: 16 MG/DL (ref 7–18)
CALCIUM BLD-MCNC: 11.9 MG/DL (ref 8.5–10.1)
CHLORIDE SERPL-SCNC: 106 MMOL/L (ref 98–112)
CO2 SERPL-SCNC: 24 MMOL/L (ref 21–32)
COLOR UR AUTO: YELLOW
CREAT BLD-MCNC: 0.71 MG/DL
EOSINOPHIL # BLD AUTO: 0.12 X10(3) UL (ref 0–0.7)
EOSINOPHIL NFR BLD AUTO: 2.4 %
ERYTHROCYTE [DISTWIDTH] IN BLOOD BY AUTOMATED COUNT: 14.3 %
GLOBULIN PLAS-MCNC: 3.2 G/DL (ref 2.8–4.4)
GLUCOSE BLD-MCNC: 88 MG/DL (ref 70–99)
GLUCOSE UR STRIP.AUTO-MCNC: NEGATIVE MG/DL
HCT VFR BLD AUTO: 48.5 %
HGB BLD-MCNC: 15.9 G/DL
IMM GRANULOCYTES # BLD AUTO: 0.01 X10(3) UL (ref 0–1)
IMM GRANULOCYTES NFR BLD: 0.2 %
LYMPHOCYTES # BLD AUTO: 2.67 X10(3) UL (ref 1–4)
LYMPHOCYTES NFR BLD AUTO: 53.7 %
MCH RBC QN AUTO: 29.5 PG (ref 26–34)
MCHC RBC AUTO-ENTMCNC: 32.8 G/DL (ref 31–37)
MCV RBC AUTO: 90 FL
MONOCYTES # BLD AUTO: 0.41 X10(3) UL (ref 0.1–1)
MONOCYTES NFR BLD AUTO: 8.2 %
NEUTROPHILS # BLD AUTO: 1.75 X10 (3) UL (ref 1.5–7.7)
NEUTROPHILS # BLD AUTO: 1.75 X10(3) UL (ref 1.5–7.7)
NEUTROPHILS NFR BLD AUTO: 35.3 %
NITRITE UR QL STRIP.AUTO: POSITIVE
OSMOLALITY SERPL CALC.SUM OF ELEC: 283 MOSM/KG (ref 275–295)
PH UR STRIP.AUTO: 5 [PH] (ref 5–8)
PLATELET # BLD AUTO: 96 10(3)UL (ref 150–450)
POTASSIUM SERPL-SCNC: 5.3 MMOL/L (ref 3.5–5.1)
PROT SERPL-MCNC: 6.6 G/DL (ref 6.4–8.2)
PROT UR STRIP.AUTO-MCNC: 30 MG/DL
RBC # BLD AUTO: 5.39 X10(6)UL
RBC #/AREA URNS AUTO: >10 /HPF
SARS-COV-2 RNA RESP QL NAA+PROBE: NOT DETECTED
SODIUM SERPL-SCNC: 136 MMOL/L (ref 136–145)
SP GR UR STRIP.AUTO: 1.01 (ref 1–1.03)
UROBILINOGEN UR STRIP.AUTO-MCNC: <2 MG/DL
WBC # BLD AUTO: 5 X10(3) UL (ref 4–11)
WBC #/AREA URNS AUTO: >50 /HPF

## 2022-01-27 PROCEDURE — 71045 X-RAY EXAM CHEST 1 VIEW: CPT | Performed by: EMERGENCY MEDICINE

## 2022-01-27 PROCEDURE — 99223 1ST HOSP IP/OBS HIGH 75: CPT | Performed by: INTERNAL MEDICINE

## 2022-01-27 RX ORDER — SODIUM CHLORIDE 9 MG/ML
INJECTION, SOLUTION INTRAVENOUS CONTINUOUS
Status: ACTIVE | OUTPATIENT
Start: 2022-01-28 | End: 2022-01-28

## 2022-01-27 RX ORDER — ACETAMINOPHEN 325 MG/1
650 TABLET ORAL EVERY 6 HOURS PRN
Status: DISCONTINUED | OUTPATIENT
Start: 2022-01-27 | End: 2022-01-31

## 2022-01-27 RX ORDER — ONDANSETRON 2 MG/ML
4 INJECTION INTRAMUSCULAR; INTRAVENOUS EVERY 6 HOURS PRN
Status: DISCONTINUED | OUTPATIENT
Start: 2022-01-27 | End: 2022-01-31

## 2022-01-27 RX ORDER — SODIUM CHLORIDE 9 MG/ML
INJECTION, SOLUTION INTRAVENOUS CONTINUOUS
Status: DISCONTINUED | OUTPATIENT
Start: 2022-01-27 | End: 2022-01-31

## 2022-01-27 RX ORDER — CIPROFLOXACIN 2 MG/ML
400 INJECTION, SOLUTION INTRAVENOUS EVERY 12 HOURS
Status: DISCONTINUED | OUTPATIENT
Start: 2022-01-27 | End: 2022-01-31

## 2022-01-27 RX ORDER — HEPARIN SODIUM 5000 [USP'U]/ML
5000 INJECTION, SOLUTION INTRAVENOUS; SUBCUTANEOUS EVERY 12 HOURS SCHEDULED
Status: DISCONTINUED | OUTPATIENT
Start: 2022-01-27 | End: 2022-01-31

## 2022-01-28 LAB
ANION GAP SERPL CALC-SCNC: 7 MMOL/L (ref 0–18)
BASOPHILS # BLD AUTO: 0.01 X10(3) UL (ref 0–0.2)
BASOPHILS NFR BLD AUTO: 0.3 %
BUN BLD-MCNC: 14 MG/DL (ref 7–18)
CALCIUM BLD-MCNC: 10.9 MG/DL (ref 8.5–10.1)
CHLORIDE SERPL-SCNC: 108 MMOL/L (ref 98–112)
CO2 SERPL-SCNC: 25 MMOL/L (ref 21–32)
CREAT BLD-MCNC: 0.66 MG/DL
EOSINOPHIL # BLD AUTO: 0.1 X10(3) UL (ref 0–0.7)
EOSINOPHIL NFR BLD AUTO: 3 %
ERYTHROCYTE [DISTWIDTH] IN BLOOD BY AUTOMATED COUNT: 14 %
GLUCOSE BLD-MCNC: 73 MG/DL (ref 70–99)
HCT VFR BLD AUTO: 40.5 %
HGB BLD-MCNC: 12.6 G/DL
IMM GRANULOCYTES # BLD AUTO: 0.01 X10(3) UL (ref 0–1)
IMM GRANULOCYTES NFR BLD: 0.3 %
LYMPHOCYTES # BLD AUTO: 1.02 X10(3) UL (ref 1–4)
LYMPHOCYTES NFR BLD AUTO: 30.5 %
MAGNESIUM SERPL-MCNC: 1.8 MG/DL (ref 1.6–2.6)
MCH RBC QN AUTO: 28.2 PG (ref 26–34)
MCHC RBC AUTO-ENTMCNC: 31.1 G/DL (ref 31–37)
MCV RBC AUTO: 90.6 FL
MONOCYTES # BLD AUTO: 0.33 X10(3) UL (ref 0.1–1)
NEUTROPHILS # BLD AUTO: 1.87 X10 (3) UL (ref 1.5–7.7)
NEUTROPHILS # BLD AUTO: 1.87 X10(3) UL (ref 1.5–7.7)
NEUTROPHILS NFR BLD AUTO: 56 %
OSMOLALITY SERPL CALC.SUM OF ELEC: 289 MOSM/KG (ref 275–295)
PHOSPHATE SERPL-MCNC: 1.9 MG/DL (ref 2.5–4.9)
PLATELET # BLD AUTO: 99 10(3)UL (ref 150–450)
POTASSIUM SERPL-SCNC: 4.1 MMOL/L (ref 3.5–5.1)
SODIUM SERPL-SCNC: 140 MMOL/L (ref 136–145)
WBC # BLD AUTO: 3.3 X10(3) UL (ref 4–11)

## 2022-01-28 PROCEDURE — 99232 SBSQ HOSP IP/OBS MODERATE 35: CPT | Performed by: HOSPITALIST

## 2022-01-28 RX ORDER — MAGNESIUM OXIDE 400 MG (241.3 MG MAGNESIUM) TABLET
400 TABLET ONCE
Status: COMPLETED | OUTPATIENT
Start: 2022-01-28 | End: 2022-01-28

## 2022-01-28 NOTE — ED INITIAL ASSESSMENT (HPI)
Pt was brought in by EMS for poor oral intake. Pt is not taking home medications. Pt was previously on hospice but revoked by pt's family.

## 2022-01-28 NOTE — PHYSICAL THERAPY NOTE
PT orders received and chart reviewed. PT had in depth discussion with the pt's dtr regarding pt's PLOF and appropriateness for skilled IP PT services. Pt stays on the second floor of the dtr's townhouse in a hospital bed. Pt has been bed bound since Oct. 2021 and the pt's dtr has been providing total assist for ADLs and bed mobility, and pt does not participate in any OOB mobility. Pt's dtr performs proper positioning for pt throughout the day to protect pt's skin integrity. Pt's dtr re-educated on proper rolling techniques, positioning, and PROM exercises (handout provided). Pt's dtr educated on proper body mechanics for her as the CG. SW informed of case and that the pt's dtr would like information regarding available resources and services. PT will sign off as pt is not appropriate for skilled IP PT at this time. Please re-order should new needs present.

## 2022-01-28 NOTE — CM/SW NOTE
01/28/22 1500   CM/SW Referral Data   Referral Source Social Work (self-referral)   Reason for Referral Discharge planning   Informant Daughter;EMR;Clinical Staff Member   Patient Info   Patient's Current Mental Status at Time of Assessment Confused or unable to complete assessment   Patient Communication Issues Language barrier   Patient's Home Environment [facility]   Number of Levels in Home 2   Patient lives with Daughter   Patient Status Prior to Admission   Independent with ADLs and Mobility No   Pt. requires assistance with Housework;Driving;Meals; Bathing;Dressing;Medications; Feeding; Toileting;Finances   Services in place prior to admission Home Health Care;DME/Supplies at home   [address] Provider [address] [phone]   Type of DME/Supplies Home Oxygen; Hospital Bed   Discharge Needs   Anticipated D/C needs Home health care;Medical equipment; Long term care facility; To be determined       Patient is a 79 y/o woman admitted with history of dementia currently admitted with UTI. Pt has history of hip fracture in 10/2021 and was discharged to home with [facility] care. Spoke with PT/OT who met with pt/dtr today. Pt has remained bedbound since previous discharge. Pt's dtr recently revoked hospice and has started [facility] services. Pt not appropriate for PT/OT at this time due to dementia (unable to follow commands) and language barrier (pt is Mandarin speaking). Met with pt's dtr at bedside to discuss DC planning. Pt's dtr stated she has been pt's sole caregiver for the past 3 months. She has received hosp[facility] care from [facility], but this has been only 2 RN visits weekly. Pt's dtr stated she recently began to regret choosing hospice care and so revoked hospice on 1/20. Hospice has been providing hospital bed and O2 through [address] [place] [address] ([phone]).   They have allowed pt's dtr to temporarily keep hospital bed while she attempts to obtain new hospital bed, but feels they may require the bed to be picked up soon. [facility] through [facility] ([phone], fax: [phone]) which was recommended by pt's PCP. They are attempting to order hospital bed through [facility] but pt's dtr unsure of status. Discussed DC planning and needs. Pt's dtr unsure about preferred plan. She expressed feeling exhaustion at being pt's sole caregiver. They have no other family available to assist her. Pt's dtr stated she may consider NH facility admission and would like to know options for this. Discussed that referrals can be sent to determine accepting providers. Pt's dtr agreeable. If pt does return home, pt's dtr would resume [facility] and would appreciate any assistance in obtaining hospital bed. [id number] [address] and confirmed pt is current with them for [facility] services. They stated they have not been able to obtain a valid order for hospital bed through pt's PCP. They are willing to resume [facility] at DC. Updated sent to [facility] via [employer]. Order and face to face note for hospital bed entered. Message sent to Dr Yan Chowdhury to request co-sign. Referrals sent to [facility] facilities via [employer] for Medicaid admission. DON requested. / to remain available for support and/or discharge planning.      Donn Phillips [facility]  Discharge Planner  [phone]

## 2022-01-28 NOTE — PROGRESS NOTES
NURSING ADMISSION NOTE      Patient admitted via stretcher. Daughter at bedside to help translate and help with admission navigator. Oriented to room. Safety precautions initiated. Bed in low position. Call light in reach.

## 2022-01-28 NOTE — PLAN OF CARE
Pt A&Ox2. Hx dementia, speaks Mandarin. Pt's daughter at bedside. VSS on room air. Telemetry monitoring - NSR. SCDs to BLE. Pt tolerating general puree diet with poor appetite. Last BM 1/27/21. Brief in place. UTI, IV cipro q12. Urine and blood cx pending. IVF infusing per orders. Denies pain at this time. Safety precautions in place and bed alarm on. Plan to discharge home with when medically cleared. POC discussed with pt's daughter. Will continue to monitor. 1800 - Pt with no urine output today. Bladder scan done at 1030 revealed 20cc and bladder scan at 1700 revealed 143cc. Dr Zohra Thomas pagebetina. Received orders for 0.9 500cc bolus and to increase IVF to 100cc/hr.

## 2022-01-28 NOTE — OCCUPATIONAL THERAPY NOTE
Received order for OT evaluation. Spoke with PT who met with pt and her daughter. Pt has been bed bound for the past several months. Daughter was providing total A with all ADL. Pt toilets in bed. OT will sign off.

## 2022-01-28 NOTE — CM/SW NOTE
Patient is a 79 y/o woman with history of dementia admitted to the hospital with generalized weakness and UTI. Patient requires a semi-electric hospital bed because an ordinary bed will not be sufficient for the patient due to dementia in which she is not able to turn herself and she requires frequent and/or immediate changes in body positions. Patient is bed bound and unable to ambulate or manage ADLs without assistance. Use of a hospital bed at home will enhance patient's safety and recovery.       [facility]  Discharge Planner  [phone]

## 2022-01-28 NOTE — ED QUICK NOTES
Orders for admission, patient is aware of plan and ready to go upstairs. Any questions, please call ED RN Alana at extension [phone]. Patient Covid vaccination status: Unvaccinated     COVID Test Ordered in ED: Rapid SARS-CoV-2 by PCR    COVID Suspicion at Admission: Low clinical suspicion for COVID    Running Infusions: Cipro    Mental Status/LOC at time of transport: A&OX4. Mandarin speaking.      Other pertinent information: 2L O2 NC  CIWA score: N/A   NIH score:  N/A

## 2022-01-29 LAB
ALBUMIN SERPL-MCNC: 2.4 G/DL (ref 3.4–5)
ALBUMIN/GLOB SERPL: 1.1 {RATIO} (ref 1–2)
ALP LIVER SERPL-CCNC: 46 U/L
ALT SERPL-CCNC: 16 U/L
ANION GAP SERPL CALC-SCNC: 10 MMOL/L (ref 0–18)
AST SERPL-CCNC: 27 U/L (ref 15–37)
BILIRUB SERPL-MCNC: 0.4 MG/DL (ref 0.1–2)
BUN BLD-MCNC: 10 MG/DL (ref 7–18)
CALCIUM BLD-MCNC: 10 MG/DL (ref 8.5–10.1)
CHLORIDE SERPL-SCNC: 115 MMOL/L (ref 98–112)
CO2 SERPL-SCNC: 20 MMOL/L (ref 21–32)
CREAT BLD-MCNC: 0.69 MG/DL
ERYTHROCYTE [DISTWIDTH] IN BLOOD BY AUTOMATED COUNT: 14.5 %
GLOBULIN PLAS-MCNC: 2.1 G/DL (ref 2.8–4.4)
GLUCOSE BLD-MCNC: 93 MG/DL (ref 70–99)
HCT VFR BLD AUTO: 37.3 %
HGB BLD-MCNC: 11.9 G/DL
MAGNESIUM SERPL-MCNC: 1.8 MG/DL (ref 1.6–2.6)
MCH RBC QN AUTO: 29.3 PG (ref 26–34)
MCHC RBC AUTO-ENTMCNC: 31.9 G/DL (ref 31–37)
MCV RBC AUTO: 91.9 FL
OSMOLALITY SERPL CALC.SUM OF ELEC: 299 MOSM/KG (ref 275–295)
PHOSPHATE SERPL-MCNC: 2.1 MG/DL (ref 2.5–4.9)
PLATELET # BLD AUTO: 87 10(3)UL (ref 150–450)
POTASSIUM SERPL-SCNC: 3.8 MMOL/L (ref 3.5–5.1)
POTASSIUM SERPL-SCNC: 3.8 MMOL/L (ref 3.5–5.1)
PROT SERPL-MCNC: 4.5 G/DL (ref 6.4–8.2)
RBC # BLD AUTO: 4.06 X10(6)UL
SODIUM SERPL-SCNC: 145 MMOL/L (ref 136–145)
WBC # BLD AUTO: 3.1 X10(3) UL (ref 4–11)

## 2022-01-29 PROCEDURE — 99232 SBSQ HOSP IP/OBS MODERATE 35: CPT | Performed by: HOSPITALIST

## 2022-01-29 RX ORDER — MAGNESIUM OXIDE 400 MG (241.3 MG MAGNESIUM) TABLET
400 TABLET ONCE
Status: COMPLETED | OUTPATIENT
Start: 2022-01-29 | End: 2022-01-29

## 2022-01-29 NOTE — PLAN OF CARE
A/o x2 hx of dementia. Primarily mandarin speaking daughter at bedside to help translate. RA/. Tele SCD's/ankle pumps encouraged. General puree diet daughter helps feed patient. LBM 1/27. IVF infusing pt having difficulty urinating at start of shift but later was able to urinate. Bedrest able to turn side to side with assistance. Urine and blood cultures pending. POC updated with pt and daughter at bedside. All safety measures in place. Call light within reach instructed pt to call for help or assistance.

## 2022-01-29 NOTE — CM/SW NOTE
Spoke w/daughter, she is agreeable to get the hospital bed from  [facility] to deliver on Monday. Daughter agreeable to that. Daughter also considering changing the [facility] companies. Will provide her with a [facility] list once it's available.

## 2022-01-29 NOTE — CM/SW NOTE
Received signed hospital bed order and signed documentation. Hospital bed order sent to [facility]. Awaiting response.

## 2022-01-29 NOTE — CM/SW NOTE
Reopened [facility] referrals.  Will provide family with a list on Friday

## 2022-01-30 LAB
MAGNESIUM SERPL-MCNC: 1.7 MG/DL (ref 1.6–2.6)
PHOSPHATE SERPL-MCNC: 2.5 MG/DL (ref 2.5–4.9)
POTASSIUM SERPL-SCNC: 4.1 MMOL/L (ref 3.5–5.1)

## 2022-01-30 PROCEDURE — 99232 SBSQ HOSP IP/OBS MODERATE 35: CPT | Performed by: HOSPITALIST

## 2022-01-30 RX ORDER — MAGNESIUM OXIDE 400 MG (241.3 MG MAGNESIUM) TABLET
400 TABLET ONCE
Status: COMPLETED | OUTPATIENT
Start: 2022-01-30 | End: 2022-01-30

## 2022-01-30 NOTE — PLAN OF CARE
A/o x2 hx of dementia. Primarily mandarin speaking daughter at bedside to help translate. RA/. Tele SCD's/ankle pumps encouraged. General puree diet daughter helps feed patient. LBM 1/29. Voiding in brief. IVF infusing. Bedrest able to turn side to side with assistance. Pt c/o of kelly leg pain tylenol given for discomfort. Urine and blood cultures pending. POC updated with pt and daughter at bedside. All safety measures in place.  Call light within reach instructed pt to call for help or assistance

## 2022-01-30 NOTE — PLAN OF CARE
Pt A&Ox2. Hx dementia, speaks Mandarin. VSS on room air. Telemetry monitoring - NSR. SCDs to BLE. Pt tolerating general puree diet with poor appetite. Last BM 1/29/21. Brief in place, incontinent. UTI, IV cipro q12. IVF infusing per orders. New PIV placed this AM. Denies pain at this time. Safety precautions in place and bed alarm on, pt's daughter at bedside. Plan to discharge home tomorrow when hospital bed delivered. SW following for home health care service. Will continue to monitor.

## 2022-01-31 VITALS
RESPIRATION RATE: 18 BRPM | WEIGHT: 108 LBS | HEART RATE: 88 BPM | BODY MASS INDEX: 19.14 KG/M2 | SYSTOLIC BLOOD PRESSURE: 137 MMHG | OXYGEN SATURATION: 96 % | TEMPERATURE: 98 F | DIASTOLIC BLOOD PRESSURE: 78 MMHG | HEIGHT: 62.99 IN

## 2022-01-31 LAB
MAGNESIUM SERPL-MCNC: 1.6 MG/DL (ref 1.6–2.6)
PHOSPHATE SERPL-MCNC: 2.7 MG/DL (ref 2.5–4.9)

## 2022-01-31 PROCEDURE — 99239 HOSP IP/OBS DSCHRG MGMT >30: CPT | Performed by: HOSPITALIST

## 2022-01-31 RX ORDER — CIPROFLOXACIN 500 MG/1
500 TABLET, FILM COATED ORAL 2 TIMES DAILY
Qty: 6 TABLET | Refills: 0 | Status: SHIPPED | OUTPATIENT
Start: 2022-01-31 | End: 2022-02-03

## 2022-01-31 NOTE — DIETARY NOTE
[id number] [facility] received call from RN about pt's daughter requesting more information/educatoon prior to pt discharge today. Visited pt and daughter at bedside. Daughter reports pt with noticeable decrease in PO intake as well as wt loss over past 3 months. Reports pt refusing to eat most of the time and thinks most of the ONS are too sweet. Rian Brown plain ordered for pt but she reports pt not really drinking too much. Provided High Calorie, High Protein nutrition therapy handout for discharge and also discussed adding unflavored protein powder to pt's food at home. Also recommend trying [employer]life milk as pt likes dairy products. Discussed liberalizing diet as much as possible and giving pt whatever food she will eat. All questions answered at this time. Will continue to monitor and follow up as appropriate.     Christopher Barahona RD, LDN, [address]   Clinical Dietitian  Spectra: [phone]  Pager: [phone]

## 2022-01-31 NOTE — CM/SW NOTE
Spoke with pt's RN who stated pt can discharge to home today. Hospital bed delivered to home by [facility] today. Met with pt's dtr at bedside. List of accepting NH facilities and [facility] providers given. Pt's dtr stated her intention to discharge to home today. She plans to look at facilities that accept pt and consider LTC options. Pt's dtr would like to consider options for [facility] providers before making a decision about [facility] services. Discussed that if pt's dtr would like to change [facility] providers she will need to cancel services with [facility]. Provided SW contact information so pt's dtr can call if she makes a decision to change [address]. Information for in home visiting MDs given. Pt's dtr requesting ambulance transport home. Ambulance transport arranged for 5:45pm.  PCS form completed and available for RN to print. Updated pt's RN. No further DC needs at this time. / to remain available for support and/or discharge planning.      [facility]  [address], [facility]  Discharge Planner  [phone]

## 2022-01-31 NOTE — PLAN OF CARE
Pt AOx1-2, mandarin speaking daughter at bedside to assist with translating, VSS on RA, , SCDs, on tele-NSR. Pureed diet with poor appetite. Last BM 01/30, incontinency noted brief in place. Denies pain, sob, cp or any distress. Safety precaution in place, call light placed within reach. POC discussed with patient and daughter.   Plan: Discharge to home when hospital bed delivered,

## 2022-01-31 NOTE — PLAN OF CARE
A&Ox2, hx of dementia, Mandarin speaking. Daughter at bedside. VSS. On room air. . Telemetry monitoring - NSR. SCDs on BLE. Patient tolerating pureed diet but with poor appetite - per daughter request, dietician notified of need for nutrition education prior to dc. Last BM 1/30. Incontinent, brief in place. Denies pain. IVF infusing as ordered. IV Cipro Q12. Plan to discharge home today once hospital bed is delivered. SW following for home care services. Daughter updated on plan of care. Safety precautions in place. Call light within reach. Will continue to monitor.

## 2022-02-01 NOTE — PROGRESS NOTES
Patient cleared by MD for discharge. Patient transported home via ambulance. Discharge instructions reviewed with daughter, all questions answered. IV removed. Belongings taken by daughter.

## 2022-04-15 ENCOUNTER — HOSPITAL ENCOUNTER (INPATIENT)
Facility: HOSPITAL | Age: 87
LOS: 5 days | Discharge: HOME HEALTH | End: 2022-04-20
Attending: EMERGENCY MEDICINE | Admitting: HOSPITALIST
Payer: MEDICARE

## 2022-04-15 ENCOUNTER — HOSPITAL ENCOUNTER (INPATIENT)
Facility: HOSPITAL | Age: 87
End: 2022-04-15
Attending: EMERGENCY MEDICINE | Admitting: HOSPITALIST
Payer: MEDICARE

## 2022-04-15 DIAGNOSIS — R41.82 ALTERED MENTAL STATUS, UNSPECIFIED ALTERED MENTAL STATUS TYPE: ICD-10-CM

## 2022-04-15 DIAGNOSIS — N30.00 ACUTE CYSTITIS WITHOUT HEMATURIA: Primary | ICD-10-CM

## 2022-04-15 LAB
ALBUMIN SERPL-MCNC: 2.6 G/DL (ref 3.4–5)
ALBUMIN/GLOB SERPL: 1 {RATIO} (ref 1–2)
ALP LIVER SERPL-CCNC: 38 U/L
ALT SERPL-CCNC: 17 U/L
ANION GAP SERPL CALC-SCNC: 6 MMOL/L (ref 0–18)
ANION GAP SERPL CALC-SCNC: 6 MMOL/L (ref 0–18)
ANTIBODY SCREEN: NEGATIVE
AST SERPL-CCNC: 46 U/L (ref 15–37)
BASOPHILS # BLD AUTO: 0.02 X10(3) UL (ref 0–0.2)
BASOPHILS NFR BLD AUTO: 0.4 %
BILIRUB SERPL-MCNC: 0.6 MG/DL (ref 0.1–2)
BILIRUB UR QL STRIP.AUTO: NEGATIVE
BUN BLD-MCNC: 27 MG/DL (ref 7–18)
BUN BLD-MCNC: 27 MG/DL (ref 7–18)
CALCIUM BLD-MCNC: 11.9 MG/DL (ref 8.5–10.1)
CALCIUM BLD-MCNC: 11.9 MG/DL (ref 8.5–10.1)
CHLORIDE SERPL-SCNC: 111 MMOL/L (ref 98–112)
CHLORIDE SERPL-SCNC: 111 MMOL/L (ref 98–112)
CO2 SERPL-SCNC: 22 MMOL/L (ref 21–32)
CO2 SERPL-SCNC: 22 MMOL/L (ref 21–32)
COLOR UR AUTO: YELLOW
CREAT BLD-MCNC: 0.78 MG/DL
CREAT BLD-MCNC: 0.78 MG/DL
EOSINOPHIL # BLD AUTO: 0.07 X10(3) UL (ref 0–0.7)
EOSINOPHIL NFR BLD AUTO: 1.6 %
ERYTHROCYTE [DISTWIDTH] IN BLOOD BY AUTOMATED COUNT: 16.1 %
GLOBULIN PLAS-MCNC: 2.5 G/DL (ref 2.8–4.4)
GLUCOSE BLD-MCNC: 115 MG/DL (ref 70–99)
GLUCOSE BLD-MCNC: 62 MG/DL (ref 70–99)
GLUCOSE BLD-MCNC: 74 MG/DL (ref 70–99)
GLUCOSE BLD-MCNC: 74 MG/DL (ref 70–99)
GLUCOSE BLD-MCNC: 96 MG/DL (ref 70–99)
GLUCOSE UR STRIP.AUTO-MCNC: NEGATIVE MG/DL
HCT VFR BLD AUTO: 32 %
HGB BLD-MCNC: 10.6 G/DL
IMM GRANULOCYTES # BLD AUTO: 0.02 X10(3) UL (ref 0–1)
IMM GRANULOCYTES NFR BLD: 0.4 %
LYMPHOCYTES # BLD AUTO: 1.53 X10(3) UL (ref 1–4)
LYMPHOCYTES NFR BLD AUTO: 34 %
MAGNESIUM SERPL-MCNC: 1.9 MG/DL (ref 1.6–2.6)
MCH RBC QN AUTO: 31 PG (ref 26–34)
MCHC RBC AUTO-ENTMCNC: 33.1 G/DL (ref 31–37)
MCV RBC AUTO: 93.6 FL
MONOCYTES # BLD AUTO: 0.26 X10(3) UL (ref 0.1–1)
MONOCYTES NFR BLD AUTO: 5.8 %
NEUTROPHILS # BLD AUTO: 2.6 X10 (3) UL (ref 1.5–7.7)
NEUTROPHILS # BLD AUTO: 2.6 X10(3) UL (ref 1.5–7.7)
NEUTROPHILS NFR BLD AUTO: 57.8 %
NITRITE UR QL STRIP.AUTO: POSITIVE
OSMOLALITY SERPL CALC.SUM OF ELEC: 292 MOSM/KG (ref 275–295)
OSMOLALITY SERPL CALC.SUM OF ELEC: 292 MOSM/KG (ref 275–295)
PH UR STRIP.AUTO: 6 [PH] (ref 5–8)
PLATELET # BLD AUTO: 115 10(3)UL (ref 150–450)
POTASSIUM SERPL-SCNC: 4.4 MMOL/L (ref 3.5–5.1)
POTASSIUM SERPL-SCNC: 4.4 MMOL/L (ref 3.5–5.1)
PROT SERPL-MCNC: 5.1 G/DL (ref 6.4–8.2)
PROT UR STRIP.AUTO-MCNC: NEGATIVE MG/DL
RBC # BLD AUTO: 3.42 X10(6)UL
RBC UR QL AUTO: NEGATIVE
RH BLOOD TYPE: POSITIVE
SARS-COV-2 RNA RESP QL NAA+PROBE: NOT DETECTED
SODIUM SERPL-SCNC: 139 MMOL/L (ref 136–145)
SODIUM SERPL-SCNC: 139 MMOL/L (ref 136–145)
SP GR UR STRIP.AUTO: 1.01 (ref 1–1.03)
UROBILINOGEN UR STRIP.AUTO-MCNC: <2 MG/DL
WBC # BLD AUTO: 4.5 X10(3) UL (ref 4–11)

## 2022-04-15 PROCEDURE — 99223 1ST HOSP IP/OBS HIGH 75: CPT | Performed by: HOSPITALIST

## 2022-04-15 RX ORDER — MORPHINE SULFATE 2 MG/ML
1 INJECTION, SOLUTION INTRAMUSCULAR; INTRAVENOUS ONCE
Status: COMPLETED | OUTPATIENT
Start: 2022-04-15 | End: 2022-04-15

## 2022-04-15 RX ORDER — SENNOSIDES 8.6 MG
17.2 TABLET ORAL NIGHTLY PRN
Status: DISCONTINUED | OUTPATIENT
Start: 2022-04-15 | End: 2022-04-20

## 2022-04-15 RX ORDER — MORPHINE SULFATE 2 MG/ML
2 INJECTION, SOLUTION INTRAMUSCULAR; INTRAVENOUS EVERY 2 HOUR PRN
Status: DISCONTINUED | OUTPATIENT
Start: 2022-04-15 | End: 2022-04-20

## 2022-04-15 RX ORDER — HALOPERIDOL 0.5 MG/1
0.5 TABLET ORAL EVERY 4 HOURS PRN
Status: DISCONTINUED | OUTPATIENT
Start: 2022-04-15 | End: 2022-04-20

## 2022-04-15 RX ORDER — SODIUM CHLORIDE 9 MG/ML
INJECTION, SOLUTION INTRAVENOUS CONTINUOUS
Status: ACTIVE | OUTPATIENT
Start: 2022-04-15 | End: 2022-04-15

## 2022-04-15 RX ORDER — HALOPERIDOL 5 MG/ML
1 INJECTION INTRAMUSCULAR EVERY 4 HOURS PRN
Status: DISCONTINUED | OUTPATIENT
Start: 2022-04-15 | End: 2022-04-20

## 2022-04-15 RX ORDER — HALOPERIDOL 5 MG/ML
0.5 INJECTION INTRAMUSCULAR EVERY 4 HOURS PRN
Status: DISCONTINUED | OUTPATIENT
Start: 2022-04-15 | End: 2022-04-20

## 2022-04-15 RX ORDER — DEXTROSE AND SODIUM CHLORIDE 5; .9 G/100ML; G/100ML
INJECTION, SOLUTION INTRAVENOUS CONTINUOUS
Status: DISCONTINUED | OUTPATIENT
Start: 2022-04-15 | End: 2022-04-19

## 2022-04-15 RX ORDER — MORPHINE SULFATE 2 MG/ML
1 INJECTION, SOLUTION INTRAMUSCULAR; INTRAVENOUS EVERY 2 HOUR PRN
Status: DISCONTINUED | OUTPATIENT
Start: 2022-04-15 | End: 2022-04-20

## 2022-04-15 RX ORDER — BISACODYL 10 MG
10 SUPPOSITORY, RECTAL RECTAL
Status: DISCONTINUED | OUTPATIENT
Start: 2022-04-15 | End: 2022-04-20

## 2022-04-15 RX ORDER — ENOXAPARIN SODIUM 100 MG/ML
30 INJECTION SUBCUTANEOUS DAILY
Status: DISCONTINUED | OUTPATIENT
Start: 2022-04-15 | End: 2022-04-20

## 2022-04-15 RX ORDER — METOCLOPRAMIDE HYDROCHLORIDE 5 MG/ML
5 INJECTION INTRAMUSCULAR; INTRAVENOUS EVERY 8 HOURS PRN
Status: DISCONTINUED | OUTPATIENT
Start: 2022-04-15 | End: 2022-04-20

## 2022-04-15 RX ORDER — MELATONIN
3 NIGHTLY PRN
Status: DISCONTINUED | OUTPATIENT
Start: 2022-04-15 | End: 2022-04-20

## 2022-04-15 RX ORDER — ONDANSETRON 2 MG/ML
4 INJECTION INTRAMUSCULAR; INTRAVENOUS EVERY 6 HOURS PRN
Status: DISCONTINUED | OUTPATIENT
Start: 2022-04-15 | End: 2022-04-20

## 2022-04-15 RX ORDER — POLYETHYLENE GLYCOL 3350 17 G/17G
17 POWDER, FOR SOLUTION ORAL DAILY PRN
Status: DISCONTINUED | OUTPATIENT
Start: 2022-04-15 | End: 2022-04-20

## 2022-04-15 RX ORDER — HALOPERIDOL 1 MG/1
1 TABLET ORAL EVERY 4 HOURS PRN
Status: DISCONTINUED | OUTPATIENT
Start: 2022-04-15 | End: 2022-04-20

## 2022-04-15 RX ORDER — LEVOFLOXACIN 5 MG/ML
500 INJECTION, SOLUTION INTRAVENOUS ONCE
Status: COMPLETED | OUTPATIENT
Start: 2022-04-15 | End: 2022-04-15

## 2022-04-15 RX ORDER — LEVOFLOXACIN 5 MG/ML
500 INJECTION, SOLUTION INTRAVENOUS
Status: DISCONTINUED | OUTPATIENT
Start: 2022-04-17 | End: 2022-04-18

## 2022-04-15 RX ORDER — ACETAMINOPHEN 325 MG/1
650 TABLET ORAL EVERY 6 HOURS PRN
Status: DISCONTINUED | OUTPATIENT
Start: 2022-04-15 | End: 2022-04-20

## 2022-04-15 RX ORDER — DEXTROSE MONOHYDRATE 25 G/50ML
50 INJECTION, SOLUTION INTRAVENOUS ONCE
Status: COMPLETED | OUTPATIENT
Start: 2022-04-15 | End: 2022-04-15

## 2022-04-15 RX ORDER — MORPHINE SULFATE 4 MG/ML
4 INJECTION, SOLUTION INTRAMUSCULAR; INTRAVENOUS EVERY 2 HOUR PRN
Status: DISCONTINUED | OUTPATIENT
Start: 2022-04-15 | End: 2022-04-19 | Stop reason: ALTCHOICE

## 2022-04-15 RX ORDER — SODIUM CHLORIDE 9 MG/ML
INJECTION, SOLUTION INTRAVENOUS CONTINUOUS
Status: DISCONTINUED | OUTPATIENT
Start: 2022-04-15 | End: 2022-04-15

## 2022-04-15 NOTE — PLAN OF CARE
Patient lethargic and confused. Unable to assess orientation. Non-verbal at this time. Patient moans when moved. Patient being turned q2 hours and pillow support provided. Morphine given for pain. VSS. IVF infusing per mar. IV abx scheduled for tonight. Gunn intact w/ minimal cloudy urine. Palliative care consulted. Problem: PAIN - ADULT  Goal: Verbalizes/displays adequate comfort level or patient's stated pain goal  Description: INTERVENTIONS:  - Encourage pt to monitor pain and request assistance  - Assess pain using appropriate pain scale  - Administer analgesics based on type and severity of pain and evaluate response  - Implement non-pharmacological measures as appropriate and evaluate response  - Consider cultural and social influences on pain and pain management  - Manage/alleviate anxiety  - Utilize distraction and/or relaxation techniques  - Monitor for opioid side effects  - Notify MD/LIP if interventions unsuccessful or patient reports new pain  - Anticipate increased pain with activity and pre-medicate as appropriate  4/15/2022 1751 by Radha Molina RN  Outcome: Progressing  4/15/2022 1630 by Radha Molina RN  Outcome: Progressing     Problem: RISK FOR INFECTION - ADULT  Goal: Absence of fever/infection during anticipated neutropenic period  Description: INTERVENTIONS  - Monitor WBC  - Administer growth factors as ordered  - Implement neutropenic guidelines  4/15/2022 1751 by Radha Molina RN  Outcome: Progressing  4/15/2022 1630 by Radha Molina RN  Outcome: Progressing     Problem: SAFETY ADULT - FALL  Goal: Free from fall injury  Description: INTERVENTIONS:  - Assess pt frequently for physical needs  - Identify cognitive and physical deficits and behaviors that affect risk of falls.   - [place] fall precautions as indicated by assessment.  - Educate pt/family on patient safety including physical limitations  - Instruct pt to call for assistance with activity based on assessment  - Modify environment to reduce risk of injury  - Provide assistive devices as appropriate  - Consider OT/PT consult to assist with strengthening/mobility  - Encourage toileting schedule  4/15/2022 1751 by Charlene Jackson RN  Outcome: Progressing  4/15/2022 1630 by Charlene Jackson RN  Outcome: Progressing     Problem: DISCHARGE PLANNING  Goal: Discharge to home or other facility with appropriate resources  Description: INTERVENTIONS:  - Identify barriers to discharge w/pt and caregiver  - Include patient/family/discharge partner in discharge planning  - Arrange for needed discharge resources and transportation as appropriate  - Identify discharge learning needs (meds, wound care, etc)  - Arrange for interpreters to assist at discharge as needed  - Consider post-discharge preferences of patient/family/discharge partner  - Complete POLST form as appropriate  - Assess patient's ability to be responsible for managing their own health  - Refer to Case Management Department for coordinating discharge planning if the patient needs post-hospital services based on physician/LIP order or complex needs related to functional status, cognitive ability or social support system  4/15/2022 1751 by Charlene Jackson RN  Outcome: Progressing  4/15/2022 1630 by Charlene Jackson RN  Outcome: Progressing     Problem: Altered Communication/Language Barrier  Goal: Patient/Family is able to understand and participate in their care  Description: Interventions:  - Assess communication ability and preferred communication style  - Implement communication aides and strategies  - Use visual cues when possible  - Listen attentively, be patient, do not interrupt  - Minimize distractions  - Allow time for understanding and response  - Establish method for patient to ask for assistance (call light)  - Provide an  as needed  - Communicate barriers and strategies to overcome with those who interact with patient  4/15/2022 1751 by Charlene Jackson RN  Outcome: Progressing  4/15/2022 1630 by Oletta Homans, RN  Outcome: Progressing

## 2022-04-15 NOTE — ED QUICK NOTES
Orders for admission, patient is aware of plan and ready to go upstairs. Any questions, please call ED RN Britney Perez at extension [phone].      Patient Covid vaccination status: Unvaccinated     COVID Test Ordered in ED: Rapid SARS-CoV-2 by PCR    COVID Suspicion at Admission: Low clinical suspicion for COVID    Running Infusions:  Levofloxacin     Mental Status/LOC at time of transport: A&Ox1     Other pertinent information:   CIWA score: N/A   NIH score:  N/A

## 2022-04-16 ENCOUNTER — APPOINTMENT (OUTPATIENT)
Dept: CT IMAGING | Facility: HOSPITAL | Age: 87
End: 2022-04-16
Attending: HOSPITALIST
Payer: MEDICARE

## 2022-04-16 LAB
ALBUMIN SERPL-MCNC: 2.4 G/DL (ref 3.4–5)
ALBUMIN/GLOB SERPL: 1 {RATIO} (ref 1–2)
ALP LIVER SERPL-CCNC: 37 U/L
ALT SERPL-CCNC: 15 U/L
ANION GAP SERPL CALC-SCNC: 3 MMOL/L (ref 0–18)
AST SERPL-CCNC: 29 U/L (ref 15–37)
BASOPHILS # BLD AUTO: 0.01 X10(3) UL (ref 0–0.2)
BASOPHILS NFR BLD AUTO: 0.3 %
BILIRUB SERPL-MCNC: 0.4 MG/DL (ref 0.1–2)
BUN BLD-MCNC: 28 MG/DL (ref 7–18)
CALCIUM BLD-MCNC: 12.1 MG/DL (ref 8.5–10.1)
CHLORIDE SERPL-SCNC: 112 MMOL/L (ref 98–112)
CO2 SERPL-SCNC: 24 MMOL/L (ref 21–32)
CREAT BLD-MCNC: 0.86 MG/DL
EOSINOPHIL # BLD AUTO: 0.02 X10(3) UL (ref 0–0.7)
EOSINOPHIL NFR BLD AUTO: 0.6 %
ERYTHROCYTE [DISTWIDTH] IN BLOOD BY AUTOMATED COUNT: 16.3 %
GLOBULIN PLAS-MCNC: 2.5 G/DL (ref 2.8–4.4)
GLUCOSE BLD-MCNC: 106 MG/DL (ref 70–99)
HCT VFR BLD AUTO: 34.5 %
HGB BLD-MCNC: 10 G/DL
IMM GRANULOCYTES # BLD AUTO: 0.02 X10(3) UL (ref 0–1)
IMM GRANULOCYTES NFR BLD: 0.6 %
LYMPHOCYTES # BLD AUTO: 0.86 X10(3) UL (ref 1–4)
LYMPHOCYTES NFR BLD AUTO: 24.4 %
MCH RBC QN AUTO: 30.2 PG (ref 26–34)
MCHC RBC AUTO-ENTMCNC: 29 G/DL (ref 31–37)
MCV RBC AUTO: 104.2 FL
MONOCYTES # BLD AUTO: 0.31 X10(3) UL (ref 0.1–1)
MONOCYTES NFR BLD AUTO: 8.8 %
NEUTROPHILS # BLD AUTO: 2.31 X10 (3) UL (ref 1.5–7.7)
NEUTROPHILS # BLD AUTO: 2.31 X10(3) UL (ref 1.5–7.7)
NEUTROPHILS NFR BLD AUTO: 65.3 %
OSMOLALITY SERPL CALC.SUM OF ELEC: 294 MOSM/KG (ref 275–295)
PLATELET # BLD AUTO: 111 10(3)UL (ref 150–450)
POTASSIUM SERPL-SCNC: 4.1 MMOL/L (ref 3.5–5.1)
PROT SERPL-MCNC: 4.9 G/DL (ref 6.4–8.2)
RBC # BLD AUTO: 3.31 X10(6)UL
SODIUM SERPL-SCNC: 139 MMOL/L (ref 136–145)
WBC # BLD AUTO: 3.5 X10(3) UL (ref 4–11)

## 2022-04-16 PROCEDURE — 74176 CT ABD & PELVIS W/O CONTRAST: CPT | Performed by: HOSPITALIST

## 2022-04-16 PROCEDURE — 99233 SBSQ HOSP IP/OBS HIGH 50: CPT | Performed by: HOSPITALIST

## 2022-04-16 NOTE — PLAN OF CARE
Pt resting in bed. Repositioned q 2 hours with pillow support. NPO per SLP eval. Gunn in place, low urine output, MD aware. IVF per mar. Pt appears comfortable in bed. Daughter at bedside. Plan of care discussed. Problem: PAIN - ADULT  Goal: Verbalizes/displays adequate comfort level or patient's stated pain goal  Description: INTERVENTIONS:  - Encourage pt to monitor pain and request assistance  - Assess pain using appropriate pain scale  - Administer analgesics based on type and severity of pain and evaluate response  - Implement non-pharmacological measures as appropriate and evaluate response  - Consider cultural and social influences on pain and pain management  - Manage/alleviate anxiety  - Utilize distraction and/or relaxation techniques  - Monitor for opioid side effects  - Notify MD/LIP if interventions unsuccessful or patient reports new pain  - Anticipate increased pain with activity and pre-medicate as appropriate  Outcome: Progressing     Problem: RISK FOR INFECTION - ADULT  Goal: Absence of fever/infection during anticipated neutropenic period  Description: INTERVENTIONS  - Monitor WBC  - Administer growth factors as ordered  - Implement neutropenic guidelines  Outcome: Progressing     Problem: SAFETY ADULT - FALL  Goal: Free from fall injury  Description: INTERVENTIONS:  - Assess pt frequently for physical needs  - Identify cognitive and physical deficits and behaviors that affect risk of falls.   - [place] fall precautions as indicated by assessment.  - Educate pt/family on patient safety including physical limitations  - Instruct pt to call for assistance with activity based on assessment  - Modify environment to reduce risk of injury  - Provide assistive devices as appropriate  - Consider OT/PT consult to assist with strengthening/mobility  - Encourage toileting schedule  Outcome: Progressing     Problem: DISCHARGE PLANNING  Goal: Discharge to home or other facility with appropriate resources  Description: INTERVENTIONS:  - Identify barriers to discharge w/pt and caregiver  - Include patient/family/discharge partner in discharge planning  - Arrange for needed discharge resources and transportation as appropriate  - Identify discharge learning needs (meds, wound care, etc)  - Arrange for interpreters to assist at discharge as needed  - Consider post-discharge preferences of patient/family/discharge partner  - Complete POLST form as appropriate  - Assess patient's ability to be responsible for managing their own health  - Refer to Case Management Department for coordinating discharge planning if the patient needs post-hospital services based on physician/LIP order or complex needs related to functional status, cognitive ability or social support system  Outcome: Progressing     Problem: Altered Communication/Language Barrier  Goal: Patient/Family is able to understand and participate in their care  Description: Interventions:  - Assess communication ability and preferred communication style  - Implement communication aides and strategies  - Use visual cues when possible  - Listen attentively, be patient, do not interrupt  - Minimize distractions  - Allow time for understanding and response  - Establish method for patient to ask for assistance (call light)  - Provide an  as needed  - Communicate barriers and strategies to overcome with those who interact with patient  Outcome: Progressing

## 2022-04-16 NOTE — PROGRESS NOTES
Writing RN discussed CT results with hospitalist MD. Orders to consult urology. Orders to flush horne as bladder appears distended on CT scan. Writing RN flushed horne, easy to flush. Flushed with 50cc of saline, 150cc output, yellow/clear. Pt tolerated. 1- Writing RN paged urology. 56- Writing RN spoke with urology MD. Orders to irrigate horne catheter q 2 hours or prn for no urine output. See orders. MD will see patient tomorrow.

## 2022-04-16 NOTE — PHYSICAL THERAPY NOTE
Orders received, chart reviewed. Upon chart review, patient stays on the 2nd floor of her daughters [facility] in a hospital bed. Patient is total care, provided by her daughter. Patient sustained a fall resulting in a broken hip in October 2021 in which patient no longer ambulatory or participatory in OOB mobility. PT spoke to RN, Marv Bateman, who agrees patient is at her baseline. Will sign-off as patient is not appropriate for skilled IP PT at this time. Please re-order if acute skilled IP PT needs arise.

## 2022-04-16 NOTE — PLAN OF CARE
Upon assessment, pt is alert x1, non verbal, moaning, Hx of dementia and Alzheimer's, daughter at bedside, pt is on RA with good O2 sats, IVF infusing to the R hand PIV, R AC PIV removed due to infiltration, pt with horne, small urine output, tan color and cloudy, bladder scan at 2130, only 39ml, on Reg diet, not eating, Morphine given for comfort, pt was able to sleep afterwards, plan of care discussed with daughter, safety measures in place, call light is at bedside, will continue to monitor    0543: On call MD paged about pt low urine output, awaiting response    Problem: PAIN - ADULT  Goal: Verbalizes/displays adequate comfort level or patient's stated pain goal  Description: INTERVENTIONS:  - Encourage pt to monitor pain and request assistance  - Assess pain using appropriate pain scale  - Administer analgesics based on type and severity of pain and evaluate response  - Implement non-pharmacological measures as appropriate and evaluate response  - Consider cultural and social influences on pain and pain management  - Manage/alleviate anxiety  - Utilize distraction and/or relaxation techniques  - Monitor for opioid side effects  - Notify MD/LIP if interventions unsuccessful or patient reports new pain  - Anticipate increased pain with activity and pre-medicate as appropriate  Outcome: Progressing     Problem: RISK FOR INFECTION - ADULT  Goal: Absence of fever/infection during anticipated neutropenic period  Description: INTERVENTIONS  - Monitor WBC  - Administer growth factors as ordered  - Implement neutropenic guidelines  Outcome: Progressing     Problem: SAFETY ADULT - FALL  Goal: Free from fall injury  Description: INTERVENTIONS:  - Assess pt frequently for physical needs  - Identify cognitive and physical deficits and behaviors that affect risk of falls.   - [place] fall precautions as indicated by assessment.  - Educate pt/family on patient safety including physical limitations  - Instruct pt to call for assistance with activity based on assessment  - Modify environment to reduce risk of injury  - Provide assistive devices as appropriate  - Consider OT/PT consult to assist with strengthening/mobility  - Encourage toileting schedule  Outcome: Progressing     Problem: DISCHARGE PLANNING  Goal: Discharge to home or other facility with appropriate resources  Description: INTERVENTIONS:  - Identify barriers to discharge w/pt and caregiver  - Include patient/family/discharge partner in discharge planning  - Arrange for needed discharge resources and transportation as appropriate  - Identify discharge learning needs (meds, wound care, etc)  - Arrange for interpreters to assist at discharge as needed  - Consider post-discharge preferences of patient/family/discharge partner  - Complete POLST form as appropriate  - Assess patient's ability to be responsible for managing their own health  - Refer to Case Management Department for coordinating discharge planning if the patient needs post-hospital services based on physician/LIP order or complex needs related to functional status, cognitive ability or social support system  Outcome: Progressing     Problem: Altered Communication/Language Barrier  Goal: Patient/Family is able to understand and participate in their care  Description: Interventions:  - Assess communication ability and preferred communication style  - Implement communication aides and strategies  - Use visual cues when possible  - Listen attentively, be patient, do not interrupt  - Minimize distractions  - Allow time for understanding and response  - Establish method for patient to ask for assistance (call light)  - Provide an  as needed  - Communicate barriers and strategies to overcome with those who interact with patient  Outcome: Progressing

## 2022-04-17 LAB
ANION GAP SERPL CALC-SCNC: 2 MMOL/L (ref 0–18)
BUN BLD-MCNC: 19 MG/DL (ref 7–18)
CALCIUM BLD-MCNC: 11 MG/DL (ref 8.5–10.1)
CHLORIDE SERPL-SCNC: 117 MMOL/L (ref 98–112)
CO2 SERPL-SCNC: 26 MMOL/L (ref 21–32)
CREAT BLD-MCNC: 0.76 MG/DL
GLUCOSE BLD-MCNC: 95 MG/DL (ref 70–99)
OSMOLALITY SERPL CALC.SUM OF ELEC: 302 MOSM/KG (ref 275–295)
POTASSIUM SERPL-SCNC: 3.1 MMOL/L (ref 3.5–5.1)
SODIUM SERPL-SCNC: 145 MMOL/L (ref 136–145)

## 2022-04-17 PROCEDURE — 99232 SBSQ HOSP IP/OBS MODERATE 35: CPT | Performed by: HOSPITALIST

## 2022-04-17 NOTE — PLAN OF CARE
Upon assessment, pt is resting in bed w/ family at bedside. DANIEL orientation, VSS, tolerating RA. Pt appears restless; treated via MAR per CPOT pain scale. Gunn catheter in place w/ small amount cloudy/yellow urine. NPO. Repositioned q2 hours. IVFs infusing. Pt family updated on poc, instructed to use call light if in need; verbalized understanding. Problem: PAIN - ADULT  Goal: Verbalizes/displays adequate comfort level or patient's stated pain goal  Description: INTERVENTIONS:  - Encourage pt to monitor pain and request assistance  - Assess pain using appropriate pain scale  - Administer analgesics based on type and severity of pain and evaluate response  - Implement non-pharmacological measures as appropriate and evaluate response  - Consider cultural and social influences on pain and pain management  - Manage/alleviate anxiety  - Utilize distraction and/or relaxation techniques  - Monitor for opioid side effects  - Notify MD/LIP if interventions unsuccessful or patient reports new pain  - Anticipate increased pain with activity and pre-medicate as appropriate  Outcome: Progressing     Problem: RISK FOR INFECTION - ADULT  Goal: Absence of fever/infection during anticipated neutropenic period  Description: INTERVENTIONS  - Monitor WBC  - Administer growth factors as ordered  - Implement neutropenic guidelines  Outcome: Progressing     Problem: SAFETY ADULT - FALL  Goal: Free from fall injury  Description: INTERVENTIONS:  - Assess pt frequently for physical needs  - Identify cognitive and physical deficits and behaviors that affect risk of falls.   - [place] fall precautions as indicated by assessment.  - Educate pt/family on patient safety including physical limitations  - Instruct pt to call for assistance with activity based on assessment  - Modify environment to reduce risk of injury  - Provide assistive devices as appropriate  - Consider OT/PT consult to assist with strengthening/mobility  - Encourage toileting schedule  Outcome: Progressing     Problem: DISCHARGE PLANNING  Goal: Discharge to home or other facility with appropriate resources  Description: INTERVENTIONS:  - Identify barriers to discharge w/pt and caregiver  - Include patient/family/discharge partner in discharge planning  - Arrange for needed discharge resources and transportation as appropriate  - Identify discharge learning needs (meds, wound care, etc)  - Arrange for interpreters to assist at discharge as needed  - Consider post-discharge preferences of patient/family/discharge partner  - Complete POLST form as appropriate  - Assess patient's ability to be responsible for managing their own health  - Refer to Case Management Department for coordinating discharge planning if the patient needs post-hospital services based on physician/LIP order or complex needs related to functional status, cognitive ability or social support system  Outcome: Progressing     Problem: Altered Communication/Language Barrier  Goal: Patient/Family is able to understand and participate in their care  Description: Interventions:  - Assess communication ability and preferred communication style  - Implement communication aides and strategies  - Use visual cues when possible  - Listen attentively, be patient, do not interrupt  - Minimize distractions  - Allow time for understanding and response  - Establish method for patient to ask for assistance (call light)  - Provide an  as needed  - Communicate barriers and strategies to overcome with those who interact with patient  Outcome: Progressing

## 2022-04-17 NOTE — PLAN OF CARE
NURSING NOTES:  0800: Pt received lethargic, not following commands. Room air. IVF at 100 ml/hr. RFA IV site. Daughter at bedside. 0830: Irrigated horne with 50 ml NS and had 400 output when aspirated. Urine not flowing to the the catheter but flushes good without resistance and able to aspirate well too. No clots or blockage noted. Will continue to irrigate horne as ordered. 1200: K-3.1, followed electrolyte protocol. New IV to MARGARITA with US machine. R hand IV leaking-removed. 1400: Dr. Emmy Marcelino see pt and will start diet. Will order pureed diet and will do bedside swallow eval.   1550: Pt tolerates few bites of pureed carrots and nectar thick water dennis cup. No signs of aspirating, no cough, no pocketing. Swallow okay with 1:1 supervision. 1700: [id number] last 10 hours. Problem: RISK FOR INFECTION - ADULT  Goal: Absence of fever/infection during anticipated neutropenic period  Description: INTERVENTIONS  - Monitor WBC  - Administer growth factors as ordered  - Implement neutropenic guidelines  Outcome: Progressing     Problem: SAFETY ADULT - FALL  Goal: Free from fall injury  Description: INTERVENTIONS:  - Assess pt frequently for physical needs  - Identify cognitive and physical deficits and behaviors that affect risk of falls.   - [place] fall precautions as indicated by assessment.  - Educate pt/family on patient safety including physical limitations  - Instruct pt to call for assistance with activity based on assessment  - Modify environment to reduce risk of injury  - Provide assistive devices as appropriate  - Consider OT/PT consult to assist with strengthening/mobility  - Encourage toileting schedule  Outcome: Progressing     Problem: Altered Communication/Language Barrier  Goal: Patient/Family is able to understand and participate in their care  Description: Interventions:  - Assess communication ability and preferred communication style  - Implement communication aides and strategies  - Use visual cues when possible  - Listen attentively, be patient, do not interrupt  - Minimize distractions  - Allow time for understanding and response  - Establish method for patient to ask for assistance (call light)  - Provide an  as needed  - Communicate barriers and strategies to overcome with those who interact with patient  Outcome: Progressing

## 2022-04-18 PROBLEM — Z51.5 PALLIATIVE CARE ENCOUNTER: Status: ACTIVE | Noted: 2022-04-18

## 2022-04-18 PROBLEM — Z71.89 GOALS OF CARE, COUNSELING/DISCUSSION: Status: ACTIVE | Noted: 2022-04-18

## 2022-04-18 LAB — POTASSIUM SERPL-SCNC: 4.2 MMOL/L (ref 3.5–5.1)

## 2022-04-18 PROCEDURE — 99222 1ST HOSP IP/OBS MODERATE 55: CPT | Performed by: NURSE PRACTITIONER

## 2022-04-18 PROCEDURE — 99232 SBSQ HOSP IP/OBS MODERATE 35: CPT | Performed by: HOSPITALIST

## 2022-04-18 NOTE — PLAN OF CARE
Upon assessment, pt is resting in bed w/ family at bedside. DANIEL orientation; per pt daughter, pt is much more alert today. Pt appears comfortable & is sleeping. Pt tolerating small amounts of mild/nectar thick diet. Tolerating RA, VSS. IVFs infusing; IV site clean/dry/intact. Horne catheter in place draining small amount of cloudy, yellow urine. Orders to irrigate horne catheter q2 hours. Pt & family instructed to use call light if in need; verbalized understanding. Call light within reach. Daughter to remain at bedside. Problem: PAIN - ADULT  Goal: Verbalizes/displays adequate comfort level or patient's stated pain goal  Description: INTERVENTIONS:  - Encourage pt to monitor pain and request assistance  - Assess pain using appropriate pain scale  - Administer analgesics based on type and severity of pain and evaluate response  - Implement non-pharmacological measures as appropriate and evaluate response  - Consider cultural and social influences on pain and pain management  - Manage/alleviate anxiety  - Utilize distraction and/or relaxation techniques  - Monitor for opioid side effects  - Notify MD/LIP if interventions unsuccessful or patient reports new pain  - Anticipate increased pain with activity and pre-medicate as appropriate  Outcome: Progressing     Problem: RISK FOR INFECTION - ADULT  Goal: Absence of fever/infection during anticipated neutropenic period  Description: INTERVENTIONS  - Monitor WBC  - Administer growth factors as ordered  - Implement neutropenic guidelines  Outcome: Progressing     Problem: SAFETY ADULT - FALL  Goal: Free from fall injury  Description: INTERVENTIONS:  - Assess pt frequently for physical needs  - Identify cognitive and physical deficits and behaviors that affect risk of falls.   - [place] fall precautions as indicated by assessment.  - Educate pt/family on patient safety including physical limitations  - Instruct pt to call for assistance with activity based on assessment  - Modify environment to reduce risk of injury  - Provide assistive devices as appropriate  - Consider OT/PT consult to assist with strengthening/mobility  - Encourage toileting schedule  Outcome: Progressing     Problem: DISCHARGE PLANNING  Goal: Discharge to home or other facility with appropriate resources  Description: INTERVENTIONS:  - Identify barriers to discharge w/pt and caregiver  - Include patient/family/discharge partner in discharge planning  - Arrange for needed discharge resources and transportation as appropriate  - Identify discharge learning needs (meds, wound care, etc)  - Arrange for interpreters to assist at discharge as needed  - Consider post-discharge preferences of patient/family/discharge partner  - Complete POLST form as appropriate  - Assess patient's ability to be responsible for managing their own health  - Refer to Case Management Department for coordinating discharge planning if the patient needs post-hospital services based on physician/LIP order or complex needs related to functional status, cognitive ability or social support system  Outcome: Progressing     Problem: Altered Communication/Language Barrier  Goal: Patient/Family is able to understand and participate in their care  Description: Interventions:  - Assess communication ability and preferred communication style  - Implement communication aides and strategies  - Use visual cues when possible  - Listen attentively, be patient, do not interrupt  - Minimize distractions  - Allow time for understanding and response  - Establish method for patient to ask for assistance (call light)  - Provide an  as needed  - Communicate barriers and strategies to overcome with those who interact with patient  Outcome: Progressing

## 2022-04-18 NOTE — OCCUPATIONAL THERAPY NOTE
OT orders received, chart reviewed. Patient known to this service, lives at home with daughter. Baseline since hip fracture in October 2021 is total care for all ADLs, no out of bed activity. No skilled OT needs at this time. Will sign off. RN aware and in agreement. Please re-order should need arise.

## 2022-04-19 LAB
ANION GAP SERPL CALC-SCNC: 5 MMOL/L (ref 0–18)
BASOPHILS # BLD AUTO: 0.01 X10(3) UL (ref 0–0.2)
BASOPHILS NFR BLD AUTO: 0.4 %
BUN BLD-MCNC: 7 MG/DL (ref 7–18)
CALCIUM BLD-MCNC: 9.8 MG/DL (ref 8.5–10.1)
CHLORIDE SERPL-SCNC: 120 MMOL/L (ref 98–112)
CO2 SERPL-SCNC: 21 MMOL/L (ref 21–32)
CREAT BLD-MCNC: 0.62 MG/DL
EOSINOPHIL # BLD AUTO: 0.05 X10(3) UL (ref 0–0.7)
EOSINOPHIL NFR BLD AUTO: 1.9 %
ERYTHROCYTE [DISTWIDTH] IN BLOOD BY AUTOMATED COUNT: 16.6 %
GLUCOSE BLD-MCNC: 96 MG/DL (ref 70–99)
HCT VFR BLD AUTO: 31.7 %
HGB BLD-MCNC: 9.9 G/DL
IMM GRANULOCYTES # BLD AUTO: 0.01 X10(3) UL (ref 0–1)
IMM GRANULOCYTES NFR BLD: 0.4 %
LYMPHOCYTES # BLD AUTO: 0.85 X10(3) UL (ref 1–4)
LYMPHOCYTES NFR BLD AUTO: 32.1 %
MCH RBC QN AUTO: 30.7 PG (ref 26–34)
MCHC RBC AUTO-ENTMCNC: 31.2 G/DL (ref 31–37)
MCV RBC AUTO: 98.4 FL
MONOCYTES # BLD AUTO: 0.26 X10(3) UL (ref 0.1–1)
MONOCYTES NFR BLD AUTO: 9.8 %
NEUTROPHILS # BLD AUTO: 1.47 X10 (3) UL (ref 1.5–7.7)
NEUTROPHILS # BLD AUTO: 1.47 X10(3) UL (ref 1.5–7.7)
NEUTROPHILS NFR BLD AUTO: 55.4 %
OSMOLALITY SERPL CALC.SUM OF ELEC: 300 MOSM/KG (ref 275–295)
PLATELET # BLD AUTO: 116 10(3)UL (ref 150–450)
PLATELET MORPHOLOGY: NORMAL
POTASSIUM SERPL-SCNC: 2.9 MMOL/L (ref 3.5–5.1)
POTASSIUM SERPL-SCNC: 4.1 MMOL/L (ref 3.5–5.1)
RBC # BLD AUTO: 3.22 X10(6)UL
SODIUM SERPL-SCNC: 146 MMOL/L (ref 136–145)
WBC # BLD AUTO: 2.7 X10(3) UL (ref 4–11)

## 2022-04-19 PROCEDURE — 99231 SBSQ HOSP IP/OBS SF/LOW 25: CPT | Performed by: NURSE PRACTITIONER

## 2022-04-19 PROCEDURE — 99232 SBSQ HOSP IP/OBS MODERATE 35: CPT | Performed by: HOSPITALIST

## 2022-04-19 RX ORDER — POTASSIUM CHLORIDE 14.9 MG/ML
20 INJECTION INTRAVENOUS ONCE
Status: COMPLETED | OUTPATIENT
Start: 2022-04-19 | End: 2022-04-19

## 2022-04-19 RX ORDER — SULFAMETHOXAZOLE AND TRIMETHOPRIM 400; 80 MG/1; MG/1
1 TABLET ORAL 2 TIMES DAILY
Qty: 20 TABLET | Refills: 0 | Status: SHIPPED | OUTPATIENT
Start: 2022-04-19 | End: 2022-04-20

## 2022-04-19 NOTE — PROGRESS NOTES
Per hospitalist, patient will not be discharged today. Writing RN will update CM/SW. Gunn catheter removed this afternoon.  Purewick in place for voiding trial.

## 2022-04-19 NOTE — PLAN OF CARE
Received pt  Who is in bed with daughter at bedside. Pt speaks chinese so unable to assess orientation. Daughter speaks with pt who is more alert today. Daughter is feeding pt small amounts of puree &  Thin liquids. Speech came to reeval today.  would like a calorie count so daughter is keeping track until pt seen by dietician. Pt urinating yellow urine in horne, flushes changed to every shift. Daughter called RN to room, stated her mother was dizzy and she was cradling her & doing acupuncure points. blood pressure was elevated/  Pt eventually  returned to baseline.

## 2022-04-19 NOTE — PLAN OF CARE
Pt resting in bed. Pt appears comfortable, alert but DANIEL orientation. Repositioned with pillows. IVF infusing per mar. Tolerating room air. Gunn draining cloudy yellow urine without difficulty. Daughter at bedside. Problem: PAIN - ADULT  Goal: Verbalizes/displays adequate comfort level or patient's stated pain goal  Description: INTERVENTIONS:  - Encourage pt to monitor pain and request assistance  - Assess pain using appropriate pain scale  - Administer analgesics based on type and severity of pain and evaluate response  - Implement non-pharmacological measures as appropriate and evaluate response  - Consider cultural and social influences on pain and pain management  - Manage/alleviate anxiety  - Utilize distraction and/or relaxation techniques  - Monitor for opioid side effects  - Notify MD/LIP if interventions unsuccessful or patient reports new pain  - Anticipate increased pain with activity and pre-medicate as appropriate  Outcome: Progressing     Problem: RISK FOR INFECTION - ADULT  Goal: Absence of fever/infection during anticipated neutropenic period  Description: INTERVENTIONS  - Monitor WBC  - Administer growth factors as ordered  - Implement neutropenic guidelines  Outcome: Progressing     Problem: SAFETY ADULT - FALL  Goal: Free from fall injury  Description: INTERVENTIONS:  - Assess pt frequently for physical needs  - Identify cognitive and physical deficits and behaviors that affect risk of falls.   - [place] fall precautions as indicated by assessment.  - Educate pt/family on patient safety including physical limitations  - Instruct pt to call for assistance with activity based on assessment  - Modify environment to reduce risk of injury  - Provide assistive devices as appropriate  - Consider OT/PT consult to assist with strengthening/mobility  - Encourage toileting schedule  Outcome: Progressing     Problem: DISCHARGE PLANNING  Goal: Discharge to home or other facility with appropriate resources  Description: INTERVENTIONS:  - Identify barriers to discharge w/pt and caregiver  - Include patient/family/discharge partner in discharge planning  - Arrange for needed discharge resources and transportation as appropriate  - Identify discharge learning needs (meds, wound care, etc)  - Arrange for interpreters to assist at discharge as needed  - Consider post-discharge preferences of patient/family/discharge partner  - Complete POLST form as appropriate  - Assess patient's ability to be responsible for managing their own health  - Refer to Case Management Department for coordinating discharge planning if the patient needs post-hospital services based on physician/LIP order or complex needs related to functional status, cognitive ability or social support system  Outcome: Progressing     Problem: Altered Communication/Language Barrier  Goal: Patient/Family is able to understand and participate in their care  Description: Interventions:  - Assess communication ability and preferred communication style  - Implement communication aides and strategies  - Use visual cues when possible  - Listen attentively, be patient, do not interrupt  - Minimize distractions  - Allow time for understanding and response  - Establish method for patient to ask for assistance (call light)  - Provide an  as needed  - Communicate barriers and strategies to overcome with those who interact with patient  Outcome: Progressing

## 2022-04-19 NOTE — PROGRESS NOTES
Pt has not yet urinated during voiding trial; has been 3 hours since catheter removal. Pt denies urge to urinate. Will give patient more time to urinate. 1930- Bladder scan was 100.

## 2022-04-19 NOTE — CM/SW NOTE
Informed by RN that patient likely ready for discharge today. Spoke with [employer] and placed Ambulance on [facility]. PCS form completed. SW will continue to follow.      [facility]  D[address] Ric[address], [facility]  Discharge Planner  [phone]

## 2022-04-19 NOTE — PLAN OF CARE
Upon assessment, pt is resting in bed w/ family at bedside. DANIEL orientation; VSS, tolerating RA. Pt is asleep in bed & appears comfortable. IVFs infusing; IV site clean/dry/intact. Pt repositioned for comfort. Gunn catheter contains small amount cloudy/yellow urine. Gunn site intact. Pt & family updated on poc & instructed to use call light if in need; verbalized understanding. Call light within reach. Problem: PAIN - ADULT  Goal: Verbalizes/displays adequate comfort level or patient's stated pain goal  Description: INTERVENTIONS:  - Encourage pt to monitor pain and request assistance  - Assess pain using appropriate pain scale  - Administer analgesics based on type and severity of pain and evaluate response  - Implement non-pharmacological measures as appropriate and evaluate response  - Consider cultural and social influences on pain and pain management  - Manage/alleviate anxiety  - Utilize distraction and/or relaxation techniques  - Monitor for opioid side effects  - Notify MD/LIP if interventions unsuccessful or patient reports new pain  - Anticipate increased pain with activity and pre-medicate as appropriate  Outcome: Progressing     Problem: RISK FOR INFECTION - ADULT  Goal: Absence of fever/infection during anticipated neutropenic period  Description: INTERVENTIONS  - Monitor WBC  - Administer growth factors as ordered  - Implement neutropenic guidelines  Outcome: Progressing     Problem: SAFETY ADULT - FALL  Goal: Free from fall injury  Description: INTERVENTIONS:  - Assess pt frequently for physical needs  - Identify cognitive and physical deficits and behaviors that affect risk of falls.   - [place] fall precautions as indicated by assessment.  - Educate pt/family on patient safety including physical limitations  - Instruct pt to call for assistance with activity based on assessment  - Modify environment to reduce risk of injury  - Provide assistive devices as appropriate  - Consider OT/PT consult to assist with strengthening/mobility  - Encourage toileting schedule  Outcome: Progressing     Problem: DISCHARGE PLANNING  Goal: Discharge to home or other facility with appropriate resources  Description: INTERVENTIONS:  - Identify barriers to discharge w/pt and caregiver  - Include patient/family/discharge partner in discharge planning  - Arrange for needed discharge resources and transportation as appropriate  - Identify discharge learning needs (meds, wound care, etc)  - Arrange for interpreters to assist at discharge as needed  - Consider post-discharge preferences of patient/family/discharge partner  - Complete POLST form as appropriate  - Assess patient's ability to be responsible for managing their own health  - Refer to Case Management Department for coordinating discharge planning if the patient needs post-hospital services based on physician/LIP order or complex needs related to functional status, cognitive ability or social support system  Outcome: Progressing     Problem: Altered Communication/Language Barrier  Goal: Patient/Family is able to understand and participate in their care  Description: Interventions:  - Assess communication ability and preferred communication style  - Implement communication aides and strategies  - Use visual cues when possible  - Listen attentively, be patient, do not interrupt  - Minimize distractions  - Allow time for understanding and response  - Establish method for patient to ask for assistance (call light)  - Provide an  as needed  - Communicate barriers and strategies to overcome with those who interact with patient  Outcome: Progressing

## 2022-04-20 ENCOUNTER — APPOINTMENT (OUTPATIENT)
Dept: ULTRASOUND IMAGING | Facility: HOSPITAL | Age: 87
End: 2022-04-20
Attending: INTERNAL MEDICINE
Payer: MEDICARE

## 2022-04-20 VITALS
TEMPERATURE: 98 F | HEART RATE: 89 BPM | BODY MASS INDEX: 18.55 KG/M2 | OXYGEN SATURATION: 99 % | WEIGHT: 86 LBS | RESPIRATION RATE: 18 BRPM | HEIGHT: 57 IN | DIASTOLIC BLOOD PRESSURE: 74 MMHG | SYSTOLIC BLOOD PRESSURE: 126 MMHG

## 2022-04-20 LAB
ANION GAP SERPL CALC-SCNC: 3 MMOL/L (ref 0–18)
BUN BLD-MCNC: 8 MG/DL (ref 7–18)
CALCIUM BLD-MCNC: 10.1 MG/DL (ref 8.5–10.1)
CHLORIDE SERPL-SCNC: 120 MMOL/L (ref 98–112)
CO2 SERPL-SCNC: 21 MMOL/L (ref 21–32)
CREAT BLD-MCNC: 0.72 MG/DL
ERYTHROCYTE [DISTWIDTH] IN BLOOD BY AUTOMATED COUNT: 16.9 %
GLUCOSE BLD-MCNC: 84 MG/DL (ref 70–99)
HCT VFR BLD AUTO: 31.1 %
HGB BLD-MCNC: 9.8 G/DL
MAGNESIUM SERPL-MCNC: 1.1 MG/DL (ref 1.6–2.6)
MCH RBC QN AUTO: 31.5 PG (ref 26–34)
MCHC RBC AUTO-ENTMCNC: 31.5 G/DL (ref 31–37)
MCV RBC AUTO: 100 FL
OSMOLALITY SERPL CALC.SUM OF ELEC: 296 MOSM/KG (ref 275–295)
PHOSPHATE SERPL-MCNC: 1.4 MG/DL (ref 2.5–4.9)
PLATELET # BLD AUTO: 100 10(3)UL (ref 150–450)
POTASSIUM SERPL-SCNC: 4 MMOL/L (ref 3.5–5.1)
RBC # BLD AUTO: 3.11 X10(6)UL
SODIUM SERPL-SCNC: 144 MMOL/L (ref 136–145)
WBC # BLD AUTO: 3.2 X10(3) UL (ref 4–11)

## 2022-04-20 PROCEDURE — 93970 EXTREMITY STUDY: CPT | Performed by: INTERNAL MEDICINE

## 2022-04-20 PROCEDURE — 99239 HOSP IP/OBS DSCHRG MGMT >30: CPT | Performed by: INTERNAL MEDICINE

## 2022-04-20 RX ORDER — TRAMADOL HYDROCHLORIDE 50 MG/1
TABLET ORAL EVERY 4 HOURS PRN
Qty: 30 TABLET | Refills: 0 | Status: SHIPPED | OUTPATIENT
Start: 2022-04-20

## 2022-04-20 RX ORDER — SULFAMETHOXAZOLE AND TRIMETHOPRIM 200; 40 MG/5ML; MG/5ML
80 SUSPENSION ORAL 2 TIMES DAILY
Qty: 140 ML | Refills: 0 | Status: SHIPPED | OUTPATIENT
Start: 2022-04-20 | End: 2022-04-27

## 2022-04-20 RX ORDER — ACETAMINOPHEN 10 MG/ML
15 INJECTION, SOLUTION INTRAVENOUS EVERY 6 HOURS PRN
Status: DISCONTINUED | OUTPATIENT
Start: 2022-04-20 | End: 2022-04-20

## 2022-04-20 NOTE — CM/SW NOTE
Informed by RN during rounds that patient likely ready for discharge today and will need to follow up outpaitent with urology regarding horne catheter. RN reports daughter has questions regarding ambulance transportation coverage to follow up appointments. Met with patient, who was sleeping, and daughter at bedside. Provided daughter with [employer] handout and informed daughter to contact [employer] a few days prior to appointment to obtain approval for ambulance transportation. Spoke with [facility] transportation and placed ambulance on will call in anticipation of discharge today. PCS form updated. Faxed PCS form to [place] [employer], Fax # [phone]. SW will remain available. [facility]   [id number]      Addendum  1:45pm  Daughter repots patient is current with [facility]. Spoke with [facility] who confirmed patient is current, sent [facility] for RN & MSW in [place] and by fax. SW will continue to follow. 2:55pm  Spoke with [facility] and scheduled  for 4pm today. Updated RN. SW remains available.        ISAÍAS Ramires  Discharge Planner  [phone]

## 2022-04-20 NOTE — PLAN OF CARE
A&Ox1, Mandarin speaking. VSS. RA. . Resp: Clear, diminished bilaterally  GI: Abdomen soft, nondistended. Hypoactive bowel sounds, not passing gas. : DTV, bladder scan: 210, unable to void. Order to straight cath per MD  Pain controlled with PRN pain medications  Bed bound  Incisions: Tailbone reddened, mepilex placed. Diet: regular, pureed diet. Denies nausea. IVF running per order. All appropriate safety measures in place. All questions and concerns addressed. Will continue to monitor     Problem: PAIN - ADULT  Goal: Verbalizes/displays adequate comfort level or patient's stated pain goal  Description: INTERVENTIONS:  - Encourage pt to monitor pain and request assistance  - Assess pain using appropriate pain scale  - Administer analgesics based on type and severity of pain and evaluate response  - Implement non-pharmacological measures as appropriate and evaluate response  - Consider cultural and social influences on pain and pain management  - Manage/alleviate anxiety  - Utilize distraction and/or relaxation techniques  - Monitor for opioid side effects  - Notify MD/LIP if interventions unsuccessful or patient reports new pain  - Anticipate increased pain with activity and pre-medicate as appropriate  Outcome: Progressing     Problem: RISK FOR INFECTION - ADULT  Goal: Absence of fever/infection during anticipated neutropenic period  Description: INTERVENTIONS  - Monitor WBC  - Administer growth factors as ordered  - Implement neutropenic guidelines  Outcome: Progressing     Problem: SAFETY ADULT - FALL  Goal: Free from fall injury  Description: INTERVENTIONS:  - Assess pt frequently for physical needs  - Identify cognitive and physical deficits and behaviors that affect risk of falls.   - Stanton fall precautions as indicated by assessment.  - Educate pt/family on patient safety including physical limitations  - Instruct pt to call for assistance with activity based on assessment  - Modify environment to reduce risk of injury  - Provide assistive devices as appropriate  - Consider OT/PT consult to assist with strengthening/mobility  - Encourage toileting schedule  Outcome: Progressing     Problem: DISCHARGE PLANNING  Goal: Discharge to home or other facility with appropriate resources  Description: INTERVENTIONS:  - Identify barriers to discharge w/pt and caregiver  - Include patient/family/discharge partner in discharge planning  - Arrange for needed discharge resources and transportation as appropriate  - Identify discharge learning needs (meds, wound care, etc)  - Arrange for interpreters to assist at discharge as needed  - Consider post-discharge preferences of patient/family/discharge partner  - Complete POLST form as appropriate  - Assess patient's ability to be responsible for managing their own health  - Refer to Case Management Department for coordinating discharge planning if the patient needs post-hospital services based on physician/LIP order or complex needs related to functional status, cognitive ability or social support system  Outcome: Progressing     Problem: Altered Communication/Language Barrier  Goal: Patient/Family is able to understand and participate in their care  Description: Interventions:  - Assess communication ability and preferred communication style  - Implement communication aides and strategies  - Use visual cues when possible  - Listen attentively, be patient, do not interrupt  - Minimize distractions  - Allow time for understanding and response  - Establish method for patient to ask for assistance (call light)  - Provide an  as needed  - Communicate barriers and strategies to overcome with those who interact with patient  Outcome: Progressing

## 2022-04-20 NOTE — PROGRESS NOTES
Patient's daughter states it is easier for patient to take liquid medications over pills and she is wondering if there are any liquid antibiotics she can take at home for the UTI. Dr. Stormy Lee paged. 1421: No return call noted from Dr. Stormy Lee, epic message sent. 1522:  Patient daughter feels as though she will be able to crush bactrim with food at home and have patient take. No return call noted from Dr. Stormy Lee. Will monitor.

## 2022-04-20 NOTE — PROGRESS NOTES
Pt resting in bed, appears comfortable. Pt remains dtv since straight cath at 0000. PVR checked at this time and was 460. Dr. Linda Dunn notified and new orders received to place horne catheter and follow up with urology outpatient. 0800: Daughter updated on above and has concerns with how she will transport mother to urology f/o. Benny Agarwal, [address] notified and will see if her insurance covers ambulance  for appointments. [id number] Madden on floor and updated on above. Stated ok to place horne catheter and will arrange for follow up in 1 week in office.

## 2022-04-20 NOTE — SLP NOTE
SPEECH DAILY NOTE - INPATIENT    ASSESSMENT & PLAN   ASSESSMENT  Pt is a 79 y/o female seen to monitor diet tolerance. Pt received drowsy and with daughter present at bedside. Daughter reported that pt has had a decreased appetite and consumed a small amount of pureed solids and thin liquids yesterday without s/s of aspiration. Recommend pt continue with a pureed diet and thin liquids. Daughter can continue to prepare food within the consistency that she sees fit for the pt and can advance patient's diet as tolerated (e.g. if/when pt decides to wear dentures to eat then can advance to soft solids if appropriate). Education provided re: aspiration precautions (e.g. limit bolus size, upright position) as well as s/s of aspiration of which to be aware. No further SLP services are warranted as pt is tolerating appropriate diet at this time. SLP available for consult should pt status change. Education provided re: recommendations. Diet Recommendations - Solids: Puree  Diet Recommendations - Liquids: Thin Liquids    Compensatory Strategies Recommended: Small bites and sips  Aspiration Precautions: Upright position  Medication Administration Recommendations: Crushed in puree                     Discharge Recommendations  Discharge Recommendations/Plan: Undetermined    Treatment Plan  Treatment Plan/Recommendations: No further inpatient SLP service warranted    Interdisciplinary Communication: Discussed with RN            GOALS  Goal #1 The patient will tolerate pureed consistency and thin liquids without overt signs or symptoms of aspiration with 90 % accuracy over 1-2 session(s). Met   Goal #2 The patient/family/caregiver will demonstrate understanding and implementation of aspiration precautions and swallow strategies independently over 1-2 session(s). In Progress   Goal #3 Pt will participate in diet upgrade trails w/o overt s/s of aspiration within 3 sessions.    Discharge   FOLLOW UP  Follow Up Needed (Documentation Required): No  SLP Follow-up Date: 04/20/22  Number of Visits to Meet Established Goals:  (1-2)    Session: 1    If you have any questions, please contact Rico Jacques  Patient seen in conjunction with graduate clinician. Agree with above report unless otherwise noted or adjusted.   Radhika Su M.A., [address] [facility]  Pager: [phone]

## 2022-04-20 NOTE — PROGRESS NOTES
NURSING DISCHARGE NOTE    Discharged Home via Ambulance. Accompanied by Support staff  Belongings Taken by patient/family. Vss. Pt alert to self. Pt mandarin speaking but responds well to daughter who does translate for patient. Pt on ra with 02 sats wnl. Lungs cta. Pt denies chest pain. Denies n/v but remains with poor appetite. Pt only taking very small amounts of pureed diet this afternoon. Abdomen rounded but soft. bsx4 present. Horne draining clear, yellow urine. Patient daughter educated on horne care and shows understanding. Iv removed, site intact. Discharge instructions reviewed with pt daughter who shows understanding. rx for bactrim liquid form and ultram sent to patient's pharmacy.  Pt discharged in stable condition

## 2022-04-20 NOTE — PROGRESS NOTES
Horne insterted at this time using sterile technique. Pt tolerated well. Clear, yellow urine returned in horne. Pt remains drowsy this am but does respond to daughter when she speaks to patient. Pt denies n/v. Denies pain at this time. Skin intact. mepilex in place to sacrum. Pt does not want to eat this am, encouraged favorite foods as able. ivf infusing with phosphorus replacement. poc updated with pt and daughter and daughter shows understanding. Will monitor.

## 2022-05-02 ENCOUNTER — APPOINTMENT (OUTPATIENT)
Dept: GENERAL RADIOLOGY | Facility: HOSPITAL | Age: 87
End: 2022-05-02
Attending: EMERGENCY MEDICINE
Payer: MEDICARE

## 2022-05-02 ENCOUNTER — HOSPITAL ENCOUNTER (INPATIENT)
Facility: HOSPITAL | Age: 87
LOS: 1 days | Discharge: HOME | End: 2022-05-04
Attending: EMERGENCY MEDICINE | Admitting: HOSPITALIST
Payer: MEDICARE

## 2022-05-02 ENCOUNTER — HOSPITAL ENCOUNTER (INPATIENT)
Facility: HOSPITAL | Age: 87
LOS: 1 days | Discharge: HOME HEALTH | End: 2022-05-04
Attending: EMERGENCY MEDICINE | Admitting: HOSPITALIST
Payer: MEDICARE

## 2022-05-02 DIAGNOSIS — N39.0 URINARY TRACT INFECTION WITHOUT HEMATURIA, SITE UNSPECIFIED: Primary | ICD-10-CM

## 2022-05-02 DIAGNOSIS — R41.82 ALTERED MENTAL STATUS, UNSPECIFIED ALTERED MENTAL STATUS TYPE: ICD-10-CM

## 2022-05-02 LAB
ALBUMIN SERPL-MCNC: 2.8 G/DL (ref 3.4–5)
ALBUMIN/GLOB SERPL: 0.9 {RATIO} (ref 1–2)
ALP LIVER SERPL-CCNC: 92 U/L
ALT SERPL-CCNC: 16 U/L
ANION GAP SERPL CALC-SCNC: 6 MMOL/L (ref 0–18)
AST SERPL-CCNC: 24 U/L (ref 15–37)
BASOPHILS # BLD AUTO: 0 X10(3) UL (ref 0–0.2)
BASOPHILS NFR BLD AUTO: 0 %
BILIRUB SERPL-MCNC: 0.5 MG/DL (ref 0.1–2)
BILIRUB UR QL STRIP.AUTO: NEGATIVE
BUN BLD-MCNC: 19 MG/DL (ref 7–18)
CALCIUM BLD-MCNC: 11.4 MG/DL (ref 8.5–10.1)
CHLORIDE SERPL-SCNC: 101 MMOL/L (ref 98–112)
CO2 SERPL-SCNC: 24 MMOL/L (ref 21–32)
COLOR UR AUTO: YELLOW
CREAT BLD-MCNC: 1.18 MG/DL
EOSINOPHIL # BLD AUTO: 0.04 X10(3) UL (ref 0–0.7)
EOSINOPHIL NFR BLD AUTO: 0.6 %
ERYTHROCYTE [DISTWIDTH] IN BLOOD BY AUTOMATED COUNT: 15.6 %
GLOBULIN PLAS-MCNC: 3.1 G/DL (ref 2.8–4.4)
GLUCOSE BLD-MCNC: 84 MG/DL (ref 70–99)
GLUCOSE UR STRIP.AUTO-MCNC: NEGATIVE MG/DL
HCT VFR BLD AUTO: 32.4 %
HGB BLD-MCNC: 10.6 G/DL
IMM GRANULOCYTES # BLD AUTO: 0.03 X10(3) UL (ref 0–1)
IMM GRANULOCYTES NFR BLD: 0.4 %
KETONES UR STRIP.AUTO-MCNC: 20 MG/DL
LYMPHOCYTES # BLD AUTO: 1.42 X10(3) UL (ref 1–4)
LYMPHOCYTES NFR BLD AUTO: 21.1 %
MCH RBC QN AUTO: 31.2 PG (ref 26–34)
MCHC RBC AUTO-ENTMCNC: 32.7 G/DL (ref 31–37)
MCV RBC AUTO: 95.3 FL
MONOCYTES # BLD AUTO: 0.28 X10(3) UL (ref 0.1–1)
MONOCYTES NFR BLD AUTO: 4.2 %
NEUTROPHILS # BLD AUTO: 4.95 X10 (3) UL (ref 1.5–7.7)
NEUTROPHILS # BLD AUTO: 4.95 X10(3) UL (ref 1.5–7.7)
NEUTROPHILS NFR BLD AUTO: 73.7 %
NITRITE UR QL STRIP.AUTO: NEGATIVE
OSMOLALITY SERPL CALC.SUM OF ELEC: 273 MOSM/KG (ref 275–295)
PH UR STRIP.AUTO: 6 [PH] (ref 5–8)
PLATELET # BLD AUTO: 177 10(3)UL (ref 150–450)
POTASSIUM SERPL-SCNC: 4.6 MMOL/L (ref 3.5–5.1)
PROT SERPL-MCNC: 5.9 G/DL (ref 6.4–8.2)
PROT UR STRIP.AUTO-MCNC: NEGATIVE MG/DL
RBC # BLD AUTO: 3.4 X10(6)UL
RBC UR QL AUTO: NEGATIVE
SARS-COV-2 RNA RESP QL NAA+PROBE: NOT DETECTED
SODIUM SERPL-SCNC: 131 MMOL/L (ref 136–145)
SP GR UR STRIP.AUTO: 1.01 (ref 1–1.03)
UROBILINOGEN UR STRIP.AUTO-MCNC: <2 MG/DL
WBC # BLD AUTO: 6.7 X10(3) UL (ref 4–11)

## 2022-05-02 PROCEDURE — 99223 1ST HOSP IP/OBS HIGH 75: CPT | Performed by: HOSPITALIST

## 2022-05-02 PROCEDURE — 71045 X-RAY EXAM CHEST 1 VIEW: CPT | Performed by: EMERGENCY MEDICINE

## 2022-05-03 LAB
ADENOVIRUS PCR:: NOT DETECTED
ANION GAP SERPL CALC-SCNC: 8 MMOL/L (ref 0–18)
ATRIAL RATE: 102 BPM
B PARAPERT DNA SPEC QL NAA+PROBE: NOT DETECTED
B PERT DNA SPEC QL NAA+PROBE: NOT DETECTED
BUN BLD-MCNC: 17 MG/DL (ref 7–18)
C PNEUM DNA SPEC QL NAA+PROBE: NOT DETECTED
CALCIUM BLD-MCNC: 10.4 MG/DL (ref 8.5–10.1)
CHLORIDE SERPL-SCNC: 107 MMOL/L (ref 98–112)
CO2 SERPL-SCNC: 22 MMOL/L (ref 21–32)
CORONAVIRUS 229E PCR:: NOT DETECTED
CORONAVIRUS HKU1 PCR:: NOT DETECTED
CORONAVIRUS NL63 PCR:: NOT DETECTED
CORONAVIRUS OC43 PCR:: NOT DETECTED
CREAT BLD-MCNC: 0.93 MG/DL
FLUAV RNA SPEC QL NAA+PROBE: NOT DETECTED
FLUBV RNA SPEC QL NAA+PROBE: NOT DETECTED
GLUCOSE BLD-MCNC: 248 MG/DL (ref 70–99)
GLUCOSE BLD-MCNC: 66 MG/DL (ref 70–99)
METAPNEUMOVIRUS PCR:: NOT DETECTED
MYCOPLASMA PNEUMONIA PCR:: NOT DETECTED
OSMOLALITY SERPL CALC.SUM OF ELEC: 284 MOSM/KG (ref 275–295)
P AXIS: 64 DEGREES
P-R INTERVAL: 242 MS
PARAINFLUENZA 1 PCR:: NOT DETECTED
PARAINFLUENZA 2 PCR:: NOT DETECTED
PARAINFLUENZA 3 PCR:: NOT DETECTED
PARAINFLUENZA 4 PCR:: NOT DETECTED
POTASSIUM SERPL-SCNC: 4 MMOL/L (ref 3.5–5.1)
Q-T INTERVAL: 304 MS
QRS DURATION: 74 MS
QTC CALCULATION (BEZET): 396 MS
R AXIS: 71 DEGREES
RHINOVIRUS/ENTERO PCR:: NOT DETECTED
RSV RNA SPEC QL NAA+PROBE: NOT DETECTED
SARS-COV-2 RNA NPH QL NAA+NON-PROBE: NOT DETECTED
SODIUM SERPL-SCNC: 137 MMOL/L (ref 136–145)
T AXIS: 31 DEGREES
VENTRICULAR RATE: 102 BPM
VIT D+METAB SERPL-MCNC: 20.5 NG/ML (ref 30–100)

## 2022-05-03 PROCEDURE — 99232 SBSQ HOSP IP/OBS MODERATE 35: CPT | Performed by: HOSPITALIST

## 2022-05-03 RX ORDER — ONDANSETRON 2 MG/ML
4 INJECTION INTRAMUSCULAR; INTRAVENOUS EVERY 6 HOURS PRN
Status: DISCONTINUED | OUTPATIENT
Start: 2022-05-03 | End: 2022-05-04

## 2022-05-03 RX ORDER — ACETAMINOPHEN 325 MG/1
650 TABLET ORAL EVERY 6 HOURS PRN
Status: DISCONTINUED | OUTPATIENT
Start: 2022-05-03 | End: 2022-05-04

## 2022-05-03 RX ORDER — CHOLECALCIFEROL (VITAMIN D3) 125 MCG
2000 CAPSULE ORAL DAILY
Status: DISCONTINUED | OUTPATIENT
Start: 2022-05-03 | End: 2022-05-04

## 2022-05-03 RX ORDER — MELATONIN
3 NIGHTLY PRN
Status: DISCONTINUED | OUTPATIENT
Start: 2022-05-03 | End: 2022-05-04

## 2022-05-03 RX ORDER — METOCLOPRAMIDE HYDROCHLORIDE 5 MG/ML
5 INJECTION INTRAMUSCULAR; INTRAVENOUS EVERY 8 HOURS PRN
Status: DISCONTINUED | OUTPATIENT
Start: 2022-05-03 | End: 2022-05-04

## 2022-05-03 RX ORDER — SODIUM CHLORIDE 9 MG/ML
INJECTION, SOLUTION INTRAVENOUS CONTINUOUS
Status: ACTIVE | OUTPATIENT
Start: 2022-05-03 | End: 2022-05-03

## 2022-05-03 RX ORDER — HEPARIN SODIUM 5000 [USP'U]/ML
5000 INJECTION, SOLUTION INTRAVENOUS; SUBCUTANEOUS EVERY 8 HOURS SCHEDULED
Status: DISCONTINUED | OUTPATIENT
Start: 2022-05-03 | End: 2022-05-04

## 2022-05-03 RX ORDER — DEXTROSE MONOHYDRATE 25 G/50ML
INJECTION, SOLUTION INTRAVENOUS
Status: COMPLETED
Start: 2022-05-03 | End: 2022-05-03

## 2022-05-03 NOTE — PHYSICAL THERAPY NOTE
Order received for PT eval per functional mobility screen and chart reviewed. Pt known to rehab department from previous admissions. Pt is non ambulatory or participatory in OOB mobility since Oct 2021. Pt has total assist for ADL and mobility from daughter. No skilled therapy warranted at this time. PT will sign off.

## 2022-05-03 NOTE — CM/SW NOTE
CM met with patient's daughter at bedside. She is requesting a wheelchair for home. CM will obtain order and send referral in aidin. Daughter also brought Hospice and not feeling ready for it yet. She is agreeable to [facility] Palliative referral. CM notified Arthor Officer at [facility] and she will pass on to the team.     &  to remain available and supportive for discharge planning needs.     Stephanie Hein RN Case Manager [phone]

## 2022-05-03 NOTE — PLAN OF CARE
NURSING ADMISSION NOTE      Patient admitted via Cart  Oriented to room. Safety precautions initiated. Bed in low position. Call light in reach. Patient alert and oriented x2. VSS. Room air. Admission navigator completed with pt's daughter at bedside.

## 2022-05-03 NOTE — ED INITIAL ASSESSMENT (HPI)
Pt presents to ed via ems for nvd since Thursday, home health nurse saw pt today and noted pt to be febrile.  Per home health nurse pt just finished course of antibiotics for uti

## 2022-05-03 NOTE — ED QUICK NOTES
Orders for admission, patient is aware of plan and ready to go upstairs. Any questions, please call ED RN joselyn  at extension [phone]. Vaccinated?  yes  Type of COVID test sent: rapid  COVID Suspicion level: Low      Titratable drug(s) infusing: n/a  Rate:n/a    LOC at time of transport: aa&ox2    Other pertinent information: speaks mandarin, daughter at bedside to translate     CIWA score=  NIH score=

## 2022-05-04 VITALS
RESPIRATION RATE: 17 BRPM | SYSTOLIC BLOOD PRESSURE: 137 MMHG | WEIGHT: 80 LBS | DIASTOLIC BLOOD PRESSURE: 82 MMHG | TEMPERATURE: 98 F | BODY MASS INDEX: 17 KG/M2 | HEART RATE: 93 BPM | OXYGEN SATURATION: 97 %

## 2022-05-04 PROCEDURE — 99239 HOSP IP/OBS DSCHRG MGMT >30: CPT | Performed by: INTERNAL MEDICINE

## 2022-05-04 NOTE — PROGRESS NOTES
NURSING DISCHARGE NOTE    Discharged Home via Ambulance. Accompanied by Family member  Belongings Taken by patient/family.

## 2022-05-04 NOTE — PROGRESS NOTES
[facility] Liaison received palliative referral for community PC services. Met with patient and daughter Jan Norris bedside to discuss [facility] services. Residential PC services discussed and is agreeable to our [facility] upon DC to home.        Agus Pitt  [facility] Liaison  [phone]

## 2022-05-04 NOTE — PROGRESS NOTES
Pt A&Ox1 on room air. Mandarin speaking, gets sad at times. VSS. Chronic horne in place with clear yellow urine output. Tolerated medications per mar. Daughter in room with patient, staying the night.

## 2022-05-04 NOTE — PLAN OF CARE
Assumed care @ 0730. Gunn catheter instilled with 200 ml ml Saline and discontinued @ 1015, Patient voided @ 1430, incontinent, post void residual 83 ml. Patient voided @ 21 , incontinent, post void residual 204 ml, notified. Patient denies bladder discomfort.

## 2022-05-04 NOTE — CM/SW NOTE
CM and RN discussed patient's DC needs in rounds. CM spoke with daughter and she states that patient has caregiver service from [facility] [phone]. Plan of Care/Barriers to discharge:  IV abx. Possible cysto    DC Plan:  Home with daughter. They have a hospital bed. Wheelchair order was sent on 5/3. [facility] to follow at DC.  &  to remain available and supportive for discharge planning needs.     Miah Houser, RN Case Manager [phone]

## 2022-05-04 NOTE — OCCUPATIONAL THERAPY NOTE
OCCUPATIONAL THERAPY  Therapist completed chart review of previous admissions, noted that patient has required total assist from daughter for ADLs and mobility since October 2021. No skilled therapy needs at this time, pt is DEP at baseline. OT will sign off.

## 2022-05-10 NOTE — CM/SW NOTE
CM received call from [facility] requesting that we fax them the DC summary and AVS for patient to [phone].  &  to remain available and supportive for discharge planning needs.     Samia Kim RN Case Manager [phone]

## 2022-05-13 ENCOUNTER — TELEPHONE (OUTPATIENT)
Dept: ORTHOPEDICS CLINIC | Facility: CLINIC | Age: 87
End: 2022-05-13

## 2022-05-13 NOTE — TELEPHONE ENCOUNTER
Please advise if you would request to see this patient in clinic. Pt is bed bound, unable to walk, and has AMS. Thank you.

## 2022-05-13 NOTE — TELEPHONE ENCOUNTER
Patient's Emergency Contact called requesiting for patient to be seen due to a transverse non displaced fx across the distal radius. Patient is currently bed bond, and has transportation issues, request to know if patient needs to be seen in office due to fx. Imaging radiology report will be sent via fax. Please advise.    call back number [phone]

## 2022-05-18 ENCOUNTER — MED REC SCAN ONLY (OUTPATIENT)
Dept: ORTHOPEDICS CLINIC | Facility: CLINIC | Age: 87
End: 2022-05-18

## 2022-05-23 ENCOUNTER — TELEMEDICINE (OUTPATIENT)
Dept: ORTHOPEDICS CLINIC | Facility: CLINIC | Age: 87
End: 2022-05-23

## 2022-05-23 DIAGNOSIS — S72.002A CLOSED FRACTURE OF LEFT HIP, INITIAL ENCOUNTER (HCC): Primary | ICD-10-CM

## 2022-06-03 ENCOUNTER — HOSPITAL ENCOUNTER (INPATIENT)
Facility: HOSPITAL | Age: 87
End: 2022-06-03
Attending: EMERGENCY MEDICINE | Admitting: INTERNAL MEDICINE
Payer: MEDICARE

## 2022-06-03 ENCOUNTER — APPOINTMENT (OUTPATIENT)
Dept: GENERAL RADIOLOGY | Facility: HOSPITAL | Age: 87
End: 2022-06-03
Attending: EMERGENCY MEDICINE
Payer: MEDICARE

## 2022-06-03 ENCOUNTER — HOSPITAL ENCOUNTER (INPATIENT)
Facility: HOSPITAL | Age: 87
LOS: 4 days | Discharge: HOSPICE | End: 2022-06-07
Attending: EMERGENCY MEDICINE | Admitting: INTERNAL MEDICINE
Payer: MEDICARE

## 2022-06-03 DIAGNOSIS — E83.52 HYPERCALCEMIA: Primary | ICD-10-CM

## 2022-06-03 LAB
ALBUMIN SERPL-MCNC: 3.2 G/DL (ref 3.4–5)
ALBUMIN/GLOB SERPL: 1.1 {RATIO} (ref 1–2)
ALP LIVER SERPL-CCNC: 60 U/L
ALT SERPL-CCNC: 15 U/L
ANION GAP SERPL CALC-SCNC: 9 MMOL/L (ref 0–18)
AST SERPL-CCNC: 43 U/L (ref 15–37)
BASOPHILS # BLD AUTO: 0.01 X10(3) UL (ref 0–0.2)
BASOPHILS NFR BLD AUTO: 0.3 %
BILIRUB SERPL-MCNC: 0.7 MG/DL (ref 0.1–2)
BUN BLD-MCNC: 23 MG/DL (ref 7–18)
CALCIUM BLD-MCNC: 14.6 MG/DL (ref 8.5–10.1)
CHLORIDE SERPL-SCNC: 101 MMOL/L (ref 98–112)
CO2 SERPL-SCNC: 29 MMOL/L (ref 21–32)
CREAT BLD-MCNC: 0.91 MG/DL
EOSINOPHIL # BLD AUTO: 0.07 X10(3) UL (ref 0–0.7)
EOSINOPHIL NFR BLD AUTO: 1.8 %
ERYTHROCYTE [DISTWIDTH] IN BLOOD BY AUTOMATED COUNT: 15.4 %
GLOBULIN PLAS-MCNC: 3 G/DL (ref 2.8–4.4)
GLUCOSE BLD-MCNC: 75 MG/DL (ref 70–99)
HCT VFR BLD AUTO: 37.4 %
HGB BLD-MCNC: 12.1 G/DL
IMM GRANULOCYTES # BLD AUTO: 0.02 X10(3) UL (ref 0–1)
IMM GRANULOCYTES NFR BLD: 0.5 %
LYMPHOCYTES # BLD AUTO: 1.78 X10(3) UL (ref 1–4)
LYMPHOCYTES NFR BLD AUTO: 46.4 %
MCH RBC QN AUTO: 31.7 PG (ref 26–34)
MCHC RBC AUTO-ENTMCNC: 32.4 G/DL (ref 31–37)
MCV RBC AUTO: 97.9 FL
MONOCYTES # BLD AUTO: 0.17 X10(3) UL (ref 0.1–1)
MONOCYTES NFR BLD AUTO: 4.4 %
NEUTROPHILS # BLD AUTO: 1.79 X10 (3) UL (ref 1.5–7.7)
NEUTROPHILS # BLD AUTO: 1.79 X10(3) UL (ref 1.5–7.7)
NEUTROPHILS NFR BLD AUTO: 46.6 %
OSMOLALITY SERPL CALC.SUM OF ELEC: 290 MOSM/KG (ref 275–295)
PLATELET # BLD AUTO: 142 10(3)UL (ref 150–450)
POTASSIUM SERPL-SCNC: 4.5 MMOL/L (ref 3.5–5.1)
PROT SERPL-MCNC: 6.2 G/DL (ref 6.4–8.2)
PTH-INTACT SERPL-MCNC: 256.9 PG/ML (ref 18.5–88)
RBC # BLD AUTO: 3.82 X10(6)UL
SARS-COV-2 RNA RESP QL NAA+PROBE: NOT DETECTED
SODIUM SERPL-SCNC: 139 MMOL/L (ref 136–145)
WBC # BLD AUTO: 3.8 X10(3) UL (ref 4–11)

## 2022-06-03 PROCEDURE — 99223 1ST HOSP IP/OBS HIGH 75: CPT | Performed by: INTERNAL MEDICINE

## 2022-06-03 PROCEDURE — 71045 X-RAY EXAM CHEST 1 VIEW: CPT | Performed by: EMERGENCY MEDICINE

## 2022-06-03 RX ORDER — FUROSEMIDE 10 MG/ML
20 INJECTION INTRAMUSCULAR; INTRAVENOUS ONCE
Status: COMPLETED | OUTPATIENT
Start: 2022-06-03 | End: 2022-06-03

## 2022-06-03 RX ORDER — ACETAMINOPHEN 325 MG/1
650 TABLET ORAL EVERY 6 HOURS PRN
Status: DISCONTINUED | OUTPATIENT
Start: 2022-06-03 | End: 2022-06-07

## 2022-06-03 RX ORDER — DEXTROSE AND SODIUM CHLORIDE 5; .9 G/100ML; G/100ML
INJECTION, SOLUTION INTRAVENOUS CONTINUOUS
Status: DISCONTINUED | OUTPATIENT
Start: 2022-06-03 | End: 2022-06-05

## 2022-06-03 RX ORDER — CALCITONIN SALMON 200 [USP'U]/ML
4 INJECTION, SOLUTION INTRAMUSCULAR; SUBCUTANEOUS 2 TIMES DAILY
Status: COMPLETED | OUTPATIENT
Start: 2022-06-03 | End: 2022-06-05

## 2022-06-03 RX ORDER — SODIUM CHLORIDE 9 MG/ML
1000 INJECTION, SOLUTION INTRAVENOUS ONCE
Status: COMPLETED | OUTPATIENT
Start: 2022-06-03 | End: 2022-06-03

## 2022-06-03 RX ORDER — ONDANSETRON 2 MG/ML
4 INJECTION INTRAMUSCULAR; INTRAVENOUS EVERY 6 HOURS PRN
Status: DISCONTINUED | OUTPATIENT
Start: 2022-06-03 | End: 2022-06-07

## 2022-06-03 RX ORDER — HEPARIN SODIUM 5000 [USP'U]/ML
5000 INJECTION, SOLUTION INTRAVENOUS; SUBCUTANEOUS EVERY 12 HOURS SCHEDULED
Status: DISCONTINUED | OUTPATIENT
Start: 2022-06-03 | End: 2022-06-07

## 2022-06-03 NOTE — HOSPICE RN NOTE
[facility] nursing visit for follow up on hospice consult order. Met with daughter Ameya Delgado at bedside in the ER. Discussed hospice benefit, hospice philosophy, palliative care with questions and concerns addressed. Patient was previously on hospice and revoked in January of this year. Hospice consulted now because patient has not been eating. Daughter brought her to the ER for this reason and is considering G-tube placement. GI consulted per ER physician. [facility] will continue to follow this patient for hospice decision pending outcome of GI consult. Please call [facility] with any questions or concerns.      Adamaris Corbin RN, [address] [place] [address] Liaison  [phone] after hours

## 2022-06-03 NOTE — HOSPICE RN NOTE
[facility] consult order received. Hospice Liaison will follow up with this patient and family in the ER.

## 2022-06-03 NOTE — ED QUICK NOTES
PT has a [facility]. We are awaiting for them to talk to the daughter about what to do.

## 2022-06-04 LAB
ANION GAP SERPL CALC-SCNC: 9 MMOL/L (ref 0–18)
ATRIAL RATE: 97 BPM
BASOPHILS # BLD AUTO: 0.01 X10(3) UL (ref 0–0.2)
BASOPHILS NFR BLD AUTO: 0.3 %
BUN BLD-MCNC: 19 MG/DL (ref 7–18)
CALCIUM BLD-MCNC: 12.9 MG/DL (ref 8.5–10.1)
CHLORIDE SERPL-SCNC: 110 MMOL/L (ref 98–112)
CO2 SERPL-SCNC: 23 MMOL/L (ref 21–32)
CREAT BLD-MCNC: 0.77 MG/DL
EOSINOPHIL # BLD AUTO: 0.01 X10(3) UL (ref 0–0.7)
EOSINOPHIL NFR BLD AUTO: 0.3 %
ERYTHROCYTE [DISTWIDTH] IN BLOOD BY AUTOMATED COUNT: 15.1 %
GLUCOSE BLD-MCNC: 101 MG/DL (ref 70–99)
HCT VFR BLD AUTO: 38.3 %
HGB BLD-MCNC: 12.3 G/DL
IMM GRANULOCYTES # BLD AUTO: 0.01 X10(3) UL (ref 0–1)
IMM GRANULOCYTES NFR BLD: 0.3 %
LYMPHOCYTES # BLD AUTO: 0.86 X10(3) UL (ref 1–4)
LYMPHOCYTES NFR BLD AUTO: 23.8 %
MCH RBC QN AUTO: 31.5 PG (ref 26–34)
MCHC RBC AUTO-ENTMCNC: 32.1 G/DL (ref 31–37)
MCV RBC AUTO: 98.2 FL
MONOCYTES # BLD AUTO: 0.13 X10(3) UL (ref 0.1–1)
MONOCYTES NFR BLD AUTO: 3.6 %
NEUTROPHILS # BLD AUTO: 2.59 X10 (3) UL (ref 1.5–7.7)
NEUTROPHILS # BLD AUTO: 2.59 X10(3) UL (ref 1.5–7.7)
NEUTROPHILS NFR BLD AUTO: 71.7 %
OSMOLALITY SERPL CALC.SUM OF ELEC: 296 MOSM/KG (ref 275–295)
PLATELET # BLD AUTO: 128 10(3)UL (ref 150–450)
POTASSIUM SERPL-SCNC: 3.5 MMOL/L (ref 3.5–5.1)
Q-T INTERVAL: 444 MS
QRS DURATION: 84 MS
QTC CALCULATION (BEZET): 552 MS
R AXIS: 65 DEGREES
RBC # BLD AUTO: 3.9 X10(6)UL
SODIUM SERPL-SCNC: 142 MMOL/L (ref 136–145)
T AXIS: 50 DEGREES
VENTRICULAR RATE: 93 BPM
WBC # BLD AUTO: 3.6 X10(3) UL (ref 4–11)

## 2022-06-04 PROCEDURE — 99223 1ST HOSP IP/OBS HIGH 75: CPT | Performed by: INTERNAL MEDICINE

## 2022-06-04 PROCEDURE — 99232 SBSQ HOSP IP/OBS MODERATE 35: CPT | Performed by: HOSPITALIST

## 2022-06-04 RX ORDER — LABETALOL HYDROCHLORIDE 5 MG/ML
10 INJECTION, SOLUTION INTRAVENOUS EVERY 6 HOURS PRN
Status: DISCONTINUED | OUTPATIENT
Start: 2022-06-04 | End: 2022-06-07

## 2022-06-04 RX ORDER — HYDRALAZINE HYDROCHLORIDE 20 MG/ML
10 INJECTION INTRAMUSCULAR; INTRAVENOUS EVERY 6 HOURS PRN
Status: DISCONTINUED | OUTPATIENT
Start: 2022-06-04 | End: 2022-06-07

## 2022-06-04 RX ORDER — LISINOPRIL 5 MG/1
5 TABLET ORAL DAILY
Status: DISCONTINUED | OUTPATIENT
Start: 2022-06-04 | End: 2022-06-07

## 2022-06-04 NOTE — PLAN OF CARE
Problem: PAIN - ADULT  Goal: Verbalizes/displays adequate comfort level or patient's stated pain goal  Description: INTERVENTIONS:  - Encourage pt to monitor pain and request assistance  - Assess pain using appropriate pain scale  - Administer analgesics based on type and severity of pain and evaluate response  - Implement non-pharmacological measures as appropriate and evaluate response  - Consider cultural and social influences on pain and pain management  - Manage/alleviate anxiety  - Utilize distraction and/or relaxation techniques  - Monitor for opioid side effects  - Notify MD/LIP if interventions unsuccessful or patient reports new pain  - Anticipate increased pain with activity and pre-medicate as appropriate  Outcome: Progressing     Problem: PAIN - ADULT  Goal: Verbalizes/displays adequate comfort level or patient's stated pain goal  Description: INTERVENTIONS:  - Encourage pt to monitor pain and request assistance  - Assess pain using appropriate pain scale  - Administer analgesics based on type and severity of pain and evaluate response  - Implement non-pharmacological measures as appropriate and evaluate response  - Consider cultural and social influences on pain and pain management  - Manage/alleviate anxiety  - Utilize distraction and/or relaxation techniques  - Monitor for opioid side effects  - Notify MD/LIP if interventions unsuccessful or patient reports new pain  - Anticipate increased pain with activity and pre-medicate as appropriate  Outcome: Progressing     Problem: GASTROINTESTINAL - ADULT  Goal: Maintains or returns to baseline bowel function  Description: INTERVENTIONS:  - Assess bowel function  - Maintain adequate hydration with IV or PO as ordered and tolerated  - Evaluate effectiveness of GI medications  - Encourage mobilization and activity  - Obtain nutritional consult as needed  - Establish a toileting routine/schedule  - Consider collaborating with pharmacy to review patient's medication profile  Outcome: Not Progressing     Problem: GASTROINTESTINAL - ADULT  Goal: Maintains or returns to baseline bowel function  Description: INTERVENTIONS:  - Assess bowel function  - Maintain adequate hydration with IV or PO as ordered and tolerated  - Evaluate effectiveness of GI medications  - Encourage mobilization and activity  - Obtain nutritional consult as needed  - Establish a toileting routine/schedule  - Consider collaborating with pharmacy to review patient's medication profile  Outcome: Not Progressing

## 2022-06-04 NOTE — PROGRESS NOTES
Patient speaks to daughter in [address], doesn't speak any English. Denies pain. Patient on room air, on telemetry sinus rhythm rate 80.

## 2022-06-04 NOTE — PROGRESS NOTES
Spoke to DR Nate Monk in regards to consult, and md thought according to  [facility] notes, consult wasn't necessary, can recall tomorrow if needed. 17-Jun-2021

## 2022-06-04 NOTE — PROGRESS NOTES
NURSING ADMISSION NOTE      Patient admitted via Cart from ER, daughter at the bedside. Awake, daughter talking to patient on different language ( mandarin) pt not  Eating at home and not taking her med. Verified code status, pt is DNR/select,  Patient's daughter would like feeding alternative for the patient. Safety precautions initiated. Bed in low position. Call light in reach. Hx of HTN, not on med. Currently SBP >170 and DBP >100s, Per tele tech , heart rhythm appeared afib,  EKG done, shows accelerated junctional rhythm, both readings text paged to hospitalist.   Will monitor. Per tele tech rhythm now shows SR with 1st degree block, made known to hospitalist.  Patient is sleeping. Daughter at the bedside.

## 2022-06-04 NOTE — ED QUICK NOTES
Orders for admission, patient is aware of plan and ready to go upstairs. Any questions, please call ED RN [facility]  at extension [phone]     Vaccinated?  yes  Type of COVID test sent: rapid  COVID Suspicion level: Low/High  low    Titratable drug(s) infusing:  Rate:  Normal saline  LOC at time of transport:  aox3  Other pertinent information:  Mandarin speaking only  CIWA score=  NIH score=

## 2022-06-05 LAB
ANION GAP SERPL CALC-SCNC: 6 MMOL/L (ref 0–18)
BUN BLD-MCNC: 17 MG/DL (ref 7–18)
CALCIUM BLD-MCNC: 11.3 MG/DL (ref 8.5–10.1)
CHLORIDE SERPL-SCNC: 116 MMOL/L (ref 98–112)
CO2 SERPL-SCNC: 25 MMOL/L (ref 21–32)
CREAT BLD-MCNC: 0.89 MG/DL
GLUCOSE BLD-MCNC: 185 MG/DL (ref 70–99)
MAGNESIUM SERPL-MCNC: 1.6 MG/DL (ref 1.6–2.6)
OSMOLALITY SERPL CALC.SUM OF ELEC: 310 MOSM/KG (ref 275–295)
POTASSIUM SERPL-SCNC: 3.4 MMOL/L (ref 3.5–5.1)
POTASSIUM SERPL-SCNC: 3.4 MMOL/L (ref 3.5–5.1)
SODIUM SERPL-SCNC: 147 MMOL/L (ref 136–145)

## 2022-06-05 PROCEDURE — 99233 SBSQ HOSP IP/OBS HIGH 50: CPT | Performed by: HOSPITALIST

## 2022-06-05 PROCEDURE — 99232 SBSQ HOSP IP/OBS MODERATE 35: CPT | Performed by: INTERNAL MEDICINE

## 2022-06-05 RX ORDER — MAGNESIUM SULFATE HEPTAHYDRATE 40 MG/ML
2 INJECTION, SOLUTION INTRAVENOUS ONCE
Status: COMPLETED | OUTPATIENT
Start: 2022-06-05 | End: 2022-06-05

## 2022-06-05 RX ORDER — DEXTROSE AND SODIUM CHLORIDE 5; .45 G/100ML; G/100ML
INJECTION, SOLUTION INTRAVENOUS CONTINUOUS
Status: DISCONTINUED | OUTPATIENT
Start: 2022-06-05 | End: 2022-06-07

## 2022-06-05 RX ORDER — BISACODYL 10 MG
10 SUPPOSITORY, RECTAL RECTAL
Status: DISCONTINUED | OUTPATIENT
Start: 2022-06-05 | End: 2022-06-07

## 2022-06-05 NOTE — PROGRESS NOTES
Patient given prn dose of apresoline iv for bp systolic of 020, given at 1552, bp decreased to 369'V systolic, patient appears comfortable while rounding with night nurse.

## 2022-06-05 NOTE — PROGRESS NOTES
Patient was seen by hospice, and they will return to see daughter of patient tomorrow. Daughter thinking about hospice but wants to speak to family prior to signing papers. Patient recived potassium replacemnt and now receiving magnesium replacemnt iv therapy, Patient repositioned, and applied barrier cream to bottom, red but no open areas noted. Patient incontient of uriine times two.

## 2022-06-05 NOTE — PROGRESS NOTES
Patient npo, was evaluated by speech therapy. Patient speaks Mandarin, daughter at bedside throughout day, and speaks Mandarin as well. Patient on room air, on telemetry sinus rhythm.  Patient was incotient of a small amount of stool, and obtained an order for dulcolax suppository,

## 2022-06-05 NOTE — PROGRESS NOTES
Notified Dr Flory Menon of patient potassium level of 3.5, and also notified that patient has increase swelling to bilateral fingers, obtained an order to decrease iv fluids toD5. 9ns to 83cc/hr, and order for electrolye replacemnt

## 2022-06-05 NOTE — PLAN OF CARE
Problem: GASTROINTESTINAL - ADULT  Goal: Maintains or returns to baseline bowel function  Description: INTERVENTIONS:  - Assess bowel function  - Maintain adequate hydration with IV or PO as ordered and tolerated  - Evaluate effectiveness of GI medications  - Encourage mobilization and activity  - Obtain nutritional consult as needed  - Establish a toileting routine/schedule  - Consider collaborating with pharmacy to review patient's medication profile  Outcome: Not Progressing     Problem: GASTROINTESTINAL - ADULT  Goal: Maintains or returns to baseline bowel function  Description: INTERVENTIONS:  - Assess bowel function  - Maintain adequate hydration with IV or PO as ordered and tolerated  - Evaluate effectiveness of GI medications  - Encourage mobilization and activity  - Obtain nutritional consult as needed  - Establish a toileting routine/schedule  - Consider collaborating with pharmacy to review patient's medication profile  Outcome: Not Progressing     Problem: METABOLIC/FLUID AND ELECTROLYTES - ADULT  Goal: Electrolytes maintained within normal limits  Description: INTERVENTIONS:  - Monitor labs and rhythm and assess patient for signs and symptoms of electrolyte imbalances  - Administer electrolyte replacement as ordered  - Monitor response to electrolyte replacements, including rhythm and repeat lab results as appropriate  - Fluid restriction as ordered  - Instruct patient on fluid and nutrition restrictions as appropriate  Outcome: Not Progressing     Problem: METABOLIC/FLUID AND ELECTROLYTES - ADULT  Goal: Electrolytes maintained within normal limits  Description: INTERVENTIONS:  - Monitor labs and rhythm and assess patient for signs and symptoms of electrolyte imbalances  - Administer electrolyte replacement as ordered  - Monitor response to electrolyte replacements, including rhythm and repeat lab results as appropriate  - Fluid restriction as ordered  - Instruct patient on fluid and nutrition restrictions as appropriate  Outcome: Not Progressing

## 2022-06-05 NOTE — HOSPICE RN NOTE
Met with patient and daughter in the patient room. Daughter tearful about the patient condition. Writer advised that we are here to support and keep the patient comfortable. Daughters concerns is that she doesn't want to feel like she didn't do enough to help the patient. Writer advised that is her condition that is having the patient be less responsive and not eating/drinking. Nothing that she has done wrong. Daughter was asking about possible G-tube placement. Dr Sam Patel also present for visit. Explained risks vs benefit of that procedure. Patient is not a likely candidate for G-tube placement. Daughter seems likely to pursue hospice but wanted to discuss with family before signing anything. Writer advised that we will follow up tomorrow. Or if she makes a decision sooner to call us and we will get everything arranged.

## 2022-06-06 LAB
ANION GAP SERPL CALC-SCNC: 5 MMOL/L (ref 0–18)
BUN BLD-MCNC: 11 MG/DL (ref 7–18)
CALCIUM BLD-MCNC: 9.8 MG/DL (ref 8.5–10.1)
CHLORIDE SERPL-SCNC: 115 MMOL/L (ref 98–112)
CO2 SERPL-SCNC: 23 MMOL/L (ref 21–32)
CREAT BLD-MCNC: 0.72 MG/DL
GLUCOSE BLD-MCNC: 115 MG/DL (ref 70–99)
MAGNESIUM SERPL-MCNC: 2 MG/DL (ref 1.6–2.6)
OSMOLALITY SERPL CALC.SUM OF ELEC: 296 MOSM/KG (ref 275–295)
POTASSIUM SERPL-SCNC: 3 MMOL/L (ref 3.5–5.1)
POTASSIUM SERPL-SCNC: 3 MMOL/L (ref 3.5–5.1)
SODIUM SERPL-SCNC: 143 MMOL/L (ref 136–145)

## 2022-06-06 PROCEDURE — 99232 SBSQ HOSP IP/OBS MODERATE 35: CPT | Performed by: HOSPITALIST

## 2022-06-06 PROCEDURE — 99232 SBSQ HOSP IP/OBS MODERATE 35: CPT | Performed by: INTERNAL MEDICINE

## 2022-06-06 RX ORDER — POTASSIUM CHLORIDE 29.8 MG/ML
40 INJECTION INTRAVENOUS ONCE
Status: COMPLETED | OUTPATIENT
Start: 2022-06-06 | End: 2022-06-06

## 2022-06-06 RX ORDER — POTASSIUM CHLORIDE 14.9 MG/ML
20 INJECTION INTRAVENOUS ONCE
Status: COMPLETED | OUTPATIENT
Start: 2022-06-06 | End: 2022-06-06

## 2022-06-06 NOTE — PLAN OF CARE
Problem: PAIN - ADULT  Goal: Verbalizes/displays adequate comfort level or patient's stated pain goal  Description: INTERVENTIONS:  - Encourage pt to monitor pain and request assistance  - Assess pain using appropriate pain scale  - Administer analgesics based on type and severity of pain and evaluate response  - Implement non-pharmacological measures as appropriate and evaluate response  - Consider cultural and social influences on pain and pain management  - Manage/alleviate anxiety  - Utilize distraction and/or relaxation techniques  - Monitor for opioid side effects  - Notify MD/LIP if interventions unsuccessful or patient reports new pain  - Anticipate increased pain with activity and pre-medicate as appropriate  Outcome: Progressing     Problem: GASTROINTESTINAL - ADULT  Goal: Maintains or returns to baseline bowel function  Description: INTERVENTIONS:  - Assess bowel function  - Maintain adequate hydration with IV or PO as ordered and tolerated  - Evaluate effectiveness of GI medications  - Encourage mobilization and activity  - Obtain nutritional consult as needed  - Establish a toileting routine/schedule  - Consider collaborating with pharmacy to review patient's medication profile  Outcome: Progressing     Received patient sleeping but easily aroused, language barrier speaks mandarin. Daughter at bedside. Patient denies any types of discomfort during patient care and turning/ repositioning but noted facial grimacing, declines pain intervention. Sacral blanchable redness but remains intact. Kept on NPO. Needs addressed. 0140 High BP but denies any discomfort, PRN BP med administered.

## 2022-06-06 NOTE — CM/SW NOTE
Met with daughter, signing consents for discharge home tomorrow with hospice. Ordering equipment, meds. Will update with time once available.      Update:  Ambulance  at 11:30 am [facility]

## 2022-06-06 NOTE — PLAN OF CARE
Pt is aox1, mandarin speaking. Daughter at bedside to translate. Pt is very lethargic. Moans with any movement or adjusting. On RA. Tele NSR. BP running high, see MAR. Cannot get BP meds as NPO. Heparin. Briefed and incontinent. IVF. NPO. Hospice to see pt later. Pt does not appear to be in pain while laying still/ no SOB or n/v/d. No swelling or edema, arms elevated on pillows. Resting in bed with bed alarm on. Will continue to monitor.          Problem: GASTROINTESTINAL - ADULT  Goal: Maintains or returns to baseline bowel function  Description: INTERVENTIONS:  - Assess bowel function  - Maintain adequate hydration with IV or PO as ordered and tolerated  - Evaluate effectiveness of GI medications  - Encourage mobilization and activity  - Obtain nutritional consult as needed  - Establish a toileting routine/schedule  - Consider collaborating with pharmacy to review patient's medication profile  Outcome: Progressing     Problem: PAIN - ADULT  Goal: Verbalizes/displays adequate comfort level or patient's stated pain goal  Description: INTERVENTIONS:  - Encourage pt to monitor pain and request assistance  - Assess pain using appropriate pain scale  - Administer analgesics based on type and severity of pain and evaluate response  - Implement non-pharmacological measures as appropriate and evaluate response  - Consider cultural and social influences on pain and pain management  - Manage/alleviate anxiety  - Utilize distraction and/or relaxation techniques  - Monitor for opioid side effects  - Notify MD/LIP if interventions unsuccessful or patient reports new pain  - Anticipate increased pain with activity and pre-medicate as appropriate  Outcome: Progressing

## 2022-06-07 VITALS
WEIGHT: 79.81 LBS | RESPIRATION RATE: 20 BRPM | SYSTOLIC BLOOD PRESSURE: 157 MMHG | OXYGEN SATURATION: 100 % | DIASTOLIC BLOOD PRESSURE: 89 MMHG | BODY MASS INDEX: 17 KG/M2 | TEMPERATURE: 98 F | HEART RATE: 81 BPM

## 2022-06-07 PROCEDURE — 99239 HOSP IP/OBS DSCHRG MGMT >30: CPT | Performed by: HOSPITALIST

## 2022-06-07 RX ORDER — POTASSIUM CHLORIDE 14.9 MG/ML
20 INJECTION INTRAVENOUS ONCE
Status: DISCONTINUED | OUTPATIENT
Start: 2022-06-07 | End: 2022-06-07

## 2022-06-07 NOTE — PLAN OF CARE
Resting in bed. Asleep & wakes up on & off. Appears fatigued. Potassium IV replacement rendered for K 3. 0. Patient is requesting to have something to eat & drink but failed swallow test w/ speech. Daughter is requesting that patient be given something by mouth. She wants to talk the doctor. Patient is scheduled for discharge at 11,home w/ hospice. All is set per hospice notes. Daughter went home to facilitate receipt of DMEs at home.

## 2022-06-07 NOTE — PROGRESS NOTES
Pt VSS, A&Ox1, pt only mandarin speaking, daughter with pt at bedside. No complaints of pain. On room air. Administered medications per mar, pt tolerated well. Call light within reach, frequent checks made, needs met. Pt to be discharged home with hospice 6/7 at 11:30.

## 2025-02-14 NOTE — PROGRESS NOTES
[facility] Progress Note    Subjective: no acute events. Pain better controlled. Not eating much, one meal per day.     Objective: awake, not alert or oriented  LLE dressing cdi, removed and incisions cdi      Assessment/Plan: 80year old female postop day # Cardiology Infectious Disease Gastroenterology

## (undated) DEVICE — STERILE POLYISOPRENE POWDER-FREE SURGICAL GLOVES: Brand: PROTEXIS

## (undated) DEVICE — GAUZE SPONGES,12 PLY: Brand: CURITY

## (undated) DEVICE — C-ARM: Brand: UNBRANDED

## (undated) DEVICE — SOL  .9 1000ML BTL

## (undated) DEVICE — ORTHO CDS-LF: Brand: MEDLINE INDUSTRIES, INC.

## (undated) DEVICE — CONVERTORS STOCKINETTE: Brand: CONVERTORS

## (undated) DEVICE — PIN FX 30.5CM 3MM NTR NL

## (undated) DEVICE — Device

## (undated) DEVICE — 3M™ IOBAN™ 2 ANTIMICROBIAL INCISE DRAPE 6648EZ: Brand: IOBAN™ 2

## (undated) DEVICE — PADDING CAST SOFT ROLL 4\"

## (undated) DEVICE — SHEET,DRAPE,70X100,STERILE: Brand: MEDLINE

## (undated) DEVICE — PIN

## (undated) DEVICE — 3M™ STERI-DRAPE™ U-DRAPE 1015: Brand: STERI-DRAPE™

## (undated) DEVICE — SUTURE MONOCRYL 2-0 SH

## (undated) DEVICE — PREMIUM WET SKIN PREP TRAY: Brand: MEDLINE INDUSTRIES, INC.

## (undated) DEVICE — SUTURE ETHILON 3-0 FSL

## (undated) DEVICE — NON-ADHERENT STRIPS,OIL EMULSION: Brand: CURITY

## (undated) DEVICE — STERILE HOOK LOCK LATEX FREE ELASTIC BANDAGE 6INX5YD: Brand: HOOK LOCK™

## (undated) DEVICE — BIT DRL 4.3MM SHRT TIB CLBRT

## (undated) DEVICE — SCD SLEEVE KNEE HI BLEND

## (undated) DEVICE — STERILE POLYISOPRENE POWDER-FREE SURGICAL GLOVES WITH EMOLLIENT COATING: Brand: PROTEXIS

## (undated) NOTE — IP AVS SNAPSHOT
[address]            (For Outpatient Use Only) Initial Admit Date: 6/3/2022   Inpt/Obs Admit Date: Inpt: 6/3/22 / Obs: N/A   Discharge Date:    Dejan Prow:  [de-identified]   MRN: [de-identified]   CSN: [id number]   CEID: [id number]        ENCOUNTER  Patient Class: Inpatient Admitting Provider: Luis Felipe Alvarez MD Unit: [address] Service: Medical Attending Provider: Su Saez DO   Bed: 433-A   Visit Type:   Referring Physician: No ref. provider found Billing Flag:    Admit Diagnosis: Hypercalcemia [E83.52]      PATIENT  Legal Name:   Juli Iyer    Legal Sex: Female  Gender ID:              [address] [facility],[address] Floor Name:    PCP:  Roge Munoz MD Home: [phone]   Address:  [facility] Marylou Porter [facility]  : 1929 (89 yrs) Mobile: [phone]         City/State/Zip: [facility] [address] Marital:  Language: [address] [place] [address] Road: [facility] SSN4: [id number] Christianity: Wishes Privacy     Race:  Ethnicity: Non  Or [address] [place] [address]   Name Relationship Legal Guardian? Home Phone Work Phone Mobile Phone   1.  Malaika Lewis  2. *No Contact Specified* Daughter      [phone]             GUARANTOR  Guarantor: Christie Chris : 1929 Home Phone: [phone]   Address: [facility]   Sex: Female Work Phone:    City/State/Zip: [facility], [address] [place] [address] Road   Rel. to Patient: Self Guarantor ID: [id number]   GUARANTOR EMPLOYER   Employer:  Status: RETIRED     COVERAGE  PRIMARY INSURANCE   Payor: MEDICARE Plan: MEDICARE PART B ONLY   Group Number:  Insurance Type: INDEMNITY   Subscriber Name: Hilary Rutherford : 1929   Subscriber ID: [id number] Pt Rel to Subscriber: Self   SECONDARY INSURANCE   Payor: MEDICAID Plan: MEDICAID   Group Number:  Insurance Type: INDEMNITY   Subscriber Name: Hilary Rutherford : 1929   Subscriber ID: [id number] Pt Rel to Subscriber: SELF   TERTIARY INSURANCE   Payor:  Plan:    Group Number:  Insurance Type:    Subscriber Name:  Nida Cosme :    Subscriber ID:  Pt Rel to Subscriber:    Hospital Account Financial Class: Medicare    2022

## (undated) NOTE — LETTER
Consuelo Bustos [address]  [address] [facility] [address], [address] [facility]  Authorization for Surgical Operation and Procedure     Date:___________                                                                                                         Time:__________ that can occur: fever and allergic reactions, hemolytic reactions, transmission of diseases such as Hepatitis, AIDS and Cytomegalovirus (CMV) and fluid overload.   In the event that I wish to have an autologous transfusion of my own blood, or a directed don when the applicable recovery period ends for purposes of reinstating the DNAR order.   10. Patients having a sterilization procedure: I understand that if the procedure is successful the results will be permanent and it will therefore be impossible for me t give me medicine and do additional procedures as necessary. Some examples are: Starting or using an “IV” to give me medicine, fluids or blood during my procedure, and having a breathing tube placed to help me breathe when I’m asleep (intubation).  In the ev rare but potential complications include headache, bleeding, infection, seizure, irregular heart rhythms, and nerve injury.     I can change my mind about having anesthesia services at any time before I get the medicine.    _________________________________

## (undated) NOTE — LETTER
Consuelo Bustos [address]  [address] [facility] [address], [address] [facility]  Authorization for Surgical Operation and Procedure     Date:___________                                                                                                         Time:__________ all, of the potential risks that can occur: fever and allergic reactions, hemolytic reactions, transmission of diseases such as Hepatitis, AIDS and Cytomegalovirus (CMV) and fluid overload.   In the event that I wish to have an autologous transfusion of my physician will determine when the applicable recovery period ends for purposes of reinstating the DNAR order.   10. Patients having a sterilization procedure: I understand that if the procedure is successful the results will be permanent and it will therefo (anesthesia doctor) to give me medicine and do additional procedures as necessary.  Some examples are: Starting or using an “IV” to give me medicine, fluids or blood during my procedure, and having a breathing tube placed to help me breathe when I’m asleep blocks”): I understand that rare but potential complications include headache, bleeding, infection, seizure, irregular heart rhythms, and nerve injury.     I can change my mind about having anesthesia services at any time before I get the medicine.    ____

## (undated) NOTE — IP AVS SNAPSHOT
Patient Demographics     Address  [address] [facility] [address] Phone  [phone] [facility]  [phone] [facility] E-mail Address  [email or web address]      Emergency Contact(s)     Name Relation Home Work Lovejoseph Daughter [phone] off pain medicine at your 1st postoperative appointment. To avoid delays in getting your narcotic pain medicine refilled, please contact the office prior to running out of medication either by phone [phone] or through [address]  Please allow 24-72 juan carlos for 1 hour each time. After a week, elevate your leg as needed if swelling is noted. Call your Surgeons office if you have:  • A temperature greater than 100.4 F.  • Increasing pain or numbness at the incision or on your operated leg.   • Increasing redne or a plastic bag to keep dry. After showering, pat the incision with a clean towel to ensure it is dry. Do this until you are cleared to get the incision wet (usually after the first postoperative appointment).   • Once cleared to get the incision wet, you (you will be on blood thinners for a total of four weeks):  • Lovenox (Enoxaparin): some patients will be prescribed Lovenox to prevent blood clots after surgery.  If you are prescribed Lovenox, your nurse will teach you how to give this injection to yourse Reconstructive, SURGERY, ORTHOPEDIC  Why: For postop follow up at 11:40pm for suture removal. If you are unable to make this appointment it is OK for hospice agent to remove the sutures instead.   Contact information:  Zi ANDRADE [address] MG-UNIT per tab TABS 2 tablet 10/17/21 0923 Given      [id number] cinacalcet (SENSIPAR) tab 30 mg 10/16/21 1709 Given      [id number] cinacalcet (SENSIPAR) tab 30 mg 10/17/21 0923 Given      [id number] docusate sodium (COLACE) cap 100 mg 10/16/21 2227 [facility] ([facility])   MCV 88.9 80.0 - 100.0 fL — [facility])   MCH 29.3 26.0 - 34.0 pg — [facility] ([facility])   MCHC 32.9 31.0 - 37.0 g/dL — [facility])   RDW 15.2 % Antonina Vogt [facility])            Hemoglobin [[id number]] (Abnormal)  Resulted: 10/16/21 2005, Resu Note by Gopi Navarro MD (Physician) filed at 10/13/2021  4:26 AM       [facility]                                                               History & Physical         Altamease Ek Patient Status:  Inpatient    1929 MRN [id number]   WXWX daughter      Reviewed    Social history:   reports that she has quit smoking. She has never used smokeless tobacco. She reports that she does not drink alcohol and does not use drugs.     Allergies:    Penicillins             HIVES  Aspirin 10/13/2021     10/13/2021    CO2 26.0 10/13/2021     10/13/2021    CA 10.3 10/13/2021    ALB 3.3 10/13/2021    ALKPHO 92 10/13/2021    BILT 0.6 10/13/2021    TP 7.1 10/13/2021    AST 36 10/13/2021    ALT 27 10/13/2021       Imaging:  XRAY pelv Author Type: Physician    Filed: 10/13/2021  4:26 AM Date of Service: 10/13/2021  3:58 AM Status: Signed    : Luis Ty MD (Physician)    Related Notes: Addendum by Luis Ty MD (Physician) filed at 10/13/2021  4:29 [address] [facility] Family history:  Family History   Problem Relation Age of Onset   • Other (primary hyperparathyroidism [Other]) Daughter    • Ovarian Cancer Daughter         different daughter      Reviewed    Social history:   reports that she has quit smoking.  She WBC 9.4 10/13/2021    HGB 11.9 10/13/2021    HCT 36.2 10/13/2021    .0 10/13/2021    CREATSERUM 1.22 10/13/2021    BUN 22 10/13/2021     10/13/2021    K 4.8 10/13/2021     10/13/2021    CO2 26.0 10/13/2021     10/13/2021    [facility] Author Type: Physician    Filed: 10/13/2021  5:57 PM Date of Service: 10/13/2021  5:51 PM Status: Signed    : Maye Malcolm MD (Physician)     Consult Orders    1.  IP consult to Orthopedic Surgery [[id number]] ordered by Matthew Geronimo standard drinks      Drug use: No      Sexual activity: Not on file       ROS:  Comprehensive 10 system review obtained and negative except as mentioned above      Physical Exam:    /73 (BP Location: Left arm)   Pulse 113   Temp 98 °F (36.7 °C) (Axil need for further or revision surgery, loss of functional independence. Patient daughter understands all of this and would like to proceed with surgical management. Plan for ORIF tomorrow. Hold anticoagulation and make NPO. MSSA decolonization protocol.  Pos caregiver, on proper techniques to safely move and care for the pt at home. 10/16/2021 all goals achieved for family/caregiver training        SUBJECTIVE  unknown    OBJECTIVE  Precautions: Bed/chair alarm;  needed (Mandarin speaking)    Claudine Edwards bed;Needs met;Call light within reach;RN aware of session/findings; All patient questions and concerns addressed; Alarm set; Family present       Therapist PPE: surgical mask, goggles and gloves during session    Patient/Family PPE: Mask - none, daughter mask Past Surgical History  Past Surgical History:   Procedure Laterality Date   • KNEE REPLACEMENT SURGERY Right fall 2014    [facility] [place], operated by [facility]   • [address] [address] Left 12/17/15    L MAGNOLIA Nephrectomy, serosal (colon) repair, Dr. [place], [facility]   • Pravin Baig · Despite use of , pt did not demonstrate understanding of any information provided. Was able to report she had pain in her leg and that she was confused.  Pt talking with  but was mostly repeating \"okay speak Mandarin, can you speak Gait Assistance: Not tested           Stoop/Curb Assistance: Not tested       Skilled Therapy Provided: Per RN dioyn to work with pt.  utilized for session via [address] [place] [address].  Per  pt was unable to follow commands, confused, and talking to the evaluation complexity is considered moderate. These impairments and comorbidities manifest themselves as functional limitations in independent bed mobility, transfers, and gait.   The patient is below baseline and would benefit from skilled inpatient PT to intertrochanteric fx on 10/14.        Date: 10/14/2021  [facility]  Preoperative Diagnosis: Acute closed traumatic displaced left hip intertrochanteric fracture  Postoperative Diagnosis: Same  Operation: Open reduction internal fixation left Weight Bearing as Tolerated    PAIN ASSESSMENT  Rating: Unable to rate  Location: L hip   Management Techniques:  Activity promotion;Relaxation;Repositioning    COGNITION  Pt speaks Mandarin   Pt unable to follow any commands   Does better with daughter pre within reach. Pt's RN/PCT was made aware of pt's present position and condition. Spoke with pt's daughter when she arrived to discuss session. Patient End of Session: In bed;Needs met;Call light within reach;RN aware of session/findings; All patient que from Occupational Therapy services. Please re-order if a new functional limitation presents during this admission.     Patient was able to achieve the following goals:  Patient able to toilet transfer: unable to perform before admission  Patient able to dr

## (undated) NOTE — IP AVS SNAPSHOT
[address]            (For Outpatient Use Only) Initial Admit Date: 2022   Inpt/Obs Admit Date: Inpt: 5/3/22 / Obs: N/A   Discharge Date:    Howard Javier:  [de-identified]   MRN: [de-identified]   CSN: [id number]   CEID: [id number]        ENCOUNTER  Patient Class: Inpatient Admitting Provider: Libby Hleton MD Unit: [address] Service: Medical Attending Provider: Ángel Isaac DO   Bed: 402-A   Visit Type:   Referring Physician: No ref. provider found Billing Flag:    Admit Diagnosis: Urinary tract infection without hematuria, site unspecified [N39.0]      PATIENT  Legal Name:   Abby Caldwell    Legal Sex: Female  Gender ID:              [address] [facility],[address] Floor Name:    PCP:  Matilde Haro MD Home: [phone]   Address:  [facility]  : 1929 (89 yrs) Mobile: [phone]         City/State/Zip: [place] [address] Marital:  Language: [facility]: [facility]: [id number] Bahai: Wishes Privacy     Race:  Ethnicity: Non  Or [address] [place] [address] Street   Name Relationship Legal Guardian? Home Phone Work Phone Mobile Phone   1.  Malaika Lewis  2. *No Contact Specified* Daughter      [phone]             GUARANTOR  Guarantor: Vera Bearden : 1929 Home Phone: [phone]   Address: [address]   Sex: Female Work Phone:    City/State/Zip: [address]   Rel. to Patient: Self Guarantor ID: [id number]   GUARANTOR EMPLOYER   Employer:  Status: RETIRED     COVERAGE  PRIMARY INSURANCE   Payor: MEDICARE Plan: MEDICARE PART B ONLY   Group Number:  Insurance Type: INDEMNITY   Subscriber Name: Katherin Rose : 1929   Subscriber ID: [id number] Pt Rel to Subscriber: Self   SECONDARY INSURANCE   Payor: MEDICAID Plan: MEDICAID   Group Number:  Insurance Type: INDEMNITY   Subscriber Name: Katherin Rose : 1929   Subscriber ID: [id number] Pt Rel to Subscriber: SELF   TERTIARY INSURANCE   Payor:  Plan:    Group Number: Insurance Type:    Subscriber Name:  Subscriber :    Subscriber ID:  Pt Rel to Subscriber:    Hospital Account Financial Class: Medicare    May 4, 2022

## (undated) NOTE — IP AVS SNAPSHOT
[address]            (For Outpatient Use Only) Initial Admit Date: 2022   Inpt/Obs Admit Date: Inpt: 22 / Obs: N/A   Discharge Date:    Dejan Prow:  [de-identified]   MRN: [de-identified]   CSN: [id number]   CEID: [id number]        ENCOUNTER  Patient Class: Inpatient Admitting Provider: Luis Felipe Alvarez MD Unit: [address] [facility] 3SW-A   Hospital Service: Medical Attending Provider: Reese England DO   Bed: 356-A   Visit Type:   Referring Physician: No ref. provider found Billing Flag:    Admit Diagnosis: Poor feeding [R63.30]      PATIENT  Legal Name:   Juli Iyer    Legal Sex: Female  Gender ID:              [address] [facility],[address]Rd Floor Name:    PCP:  Roge Munoz MD Home: [phone]   [facility]  [facility]  : 1929 (89 yrs) Mobile: [phone]         City/[facility]/Zip: [facility] [address] Marital:  Language: [facility]: [facility]: [id number] Orthodoxy: Wishes Privacy     Race:  Ethnicity: Non  Or [address] [place] [address]   Name Relationship Legal Guardian? Home Phone Work Phone Mobile Phone   1.  Malaika Lewis  2. *No Contact Specified* Daughter      [phone]             GUARANTOR  Guarantor: Christie Chris : 1929 Home Phone: [phone]   Address: [facility]   Sex: Female Work Phone:    City/State/Zip: [facility], [address] [place] [address] Road   Rel. to Patient: Self Guarantor ID: [id number]   GUARANTOR EMPLOYER   Employer:  Status: RETIRED     COVERAGE  PRIMARY INSURANCE   Payor: MEDICARE Plan: MEDICARE PART B ONLY   Group Number:  Insurance Type: INDEMNITY   Subscriber Name: Hilary Rutherford : 1929   Subscriber ID: [id number] Pt Rel to Subscriber: Self   SECONDARY INSURANCE   Payor: MEDICAID Plan: MEDICAID   Group Number:  Insurance Type: INDEMNITY   Subscriber Name: Hilary Rutherford : 1929   Subscriber ID: [id number] Pt Rel to Subscriber: SELF   TERTIARY INSURANCE   Payor:  Plan:    Group Number:  Insurance Type:    Subscriber Name: Subscriber :    Subscriber ID:  Pt Rel to Subscriber:    Hospital Account Financial Class: Medicare    2022

## (undated) NOTE — IP AVS SNAPSHOT
[address]            (For Outpatient Use Only) Initial Admit Date: 4/15/2022   Inpt/Obs Admit Date: Inpt: 4/15/22 / Obs: N/A   Discharge Date:    [place] Cornea:  [de-identified]   MRN: [de-identified]   CSN: [id number]   CEID: [id number]        ENCOUNTER  Patient Class: Inpatient Admitting Provider: Denisse Caruso MD Unit: [place] [address] Service: Medical Attending Provider: Kai Giron DO   Bed: 303-A   Visit Type:   Referring Physician: No ref. provider found Billing Flag:    Admit Diagnosis: Acute cystitis without hematuria [N30.00]      PATIENT  Legal Name:   Lalo Guaman    Legal Sex: Female  Gender ID:              [address] [facility],[address] Floor Name:    PCP:  Juventino Wilson MD Home: [phone]   Address:  [facility]  : 1929 (89 yrs) Mobile: [phone]         City/State/Zip: [place] [address] Marital:  Language: [address] [place] [address] Road: [facility] SSN4: [id number] Protestant: Wishes Privacy     Race:  Ethnicity: Non  Or [address] [place] [address]   Name Relationship Legal Guardian? Home Phone Work Phone Mobile Phone   1.  Malaika Lewis  2. *No Contact Specified* Daughter      [phone]             GUARANTOR  Guarantor: Milton Juarez : 1929 Home Phone: [phone]   Address: [facility]   Sex: Female Work Phone:    City/State/Zip: [place], [address] [place] [address] Road   Rel. to Patient: Self Guarantor ID: [id number]   GUARANTOR EMPLOYER   Employer:  Status: RETIRED     COVERAGE  PRIMARY INSURANCE   Payor: MEDICARE Plan: MEDICARE PART B ONLY   Group Number:  Insurance Type: INDEMNITY   Subscriber Name: Saw Lugo : 1929   Subscriber ID: [id number] Pt Rel to Subscriber: Self   SECONDARY INSURANCE   Payor: MEDICAID Plan: MEDICAID   Group Number:  Insurance Type: INDEMNITY   Subscriber Name: Saw Lugo : 1929   Subscriber ID: [id number] Pt Rel to Subscriber: SELF   TERTIARY INSURANCE   Payor:  Plan:    Group Number:  Insurance Type: Subscriber Name:  Claire Durand :    Subscriber ID:  Pt Rel to Subscriber:    Hospital Account Financial Class: Medicare    2022

## (undated) NOTE — IP AVS SNAPSHOT
[address]            (For Outpatient Use Only) Initial Admit Date: 10/12/2021   Inpt/Obs Admit Date: Inpt: 10/13/21 / Obs: N/A   Discharge Date:    Jacek Captain:  [de-identified]   MRN: [de-identified]   CSN: [id number]   CEID: [id number] Insurance Type:    Subscriber Name:  Subscriber :    Subscriber ID:  Pt Rel to Subscriber:    Hospital Account Financial Class: Medicare    2021